# Patient Record
Sex: FEMALE | Race: BLACK OR AFRICAN AMERICAN | Employment: UNEMPLOYED | URBAN - METROPOLITAN AREA
[De-identification: names, ages, dates, MRNs, and addresses within clinical notes are randomized per-mention and may not be internally consistent; named-entity substitution may affect disease eponyms.]

---

## 2017-05-23 ENCOUNTER — OFFICE VISIT (OUTPATIENT)
Dept: OBGYN CLINIC | Age: 62
End: 2017-05-23

## 2017-05-23 VITALS
BODY MASS INDEX: 48.82 KG/M2 | SYSTOLIC BLOOD PRESSURE: 113 MMHG | RESPIRATION RATE: 22 BRPM | WEIGHT: 293 LBS | HEIGHT: 65 IN | TEMPERATURE: 98.2 F | DIASTOLIC BLOOD PRESSURE: 75 MMHG | HEART RATE: 109 BPM

## 2017-05-23 DIAGNOSIS — M79.652 PAIN IN BOTH THIGHS: ICD-10-CM

## 2017-05-23 DIAGNOSIS — N95.0 POST-MENOPAUSAL BLEEDING: Primary | ICD-10-CM

## 2017-05-23 DIAGNOSIS — M79.651 PAIN IN BOTH THIGHS: ICD-10-CM

## 2017-05-23 NOTE — MR AVS SNAPSHOT
Visit Information Date & Time Provider Department Dept. Phone Encounter #  
 5/23/2017  2:30 PM Mana Mcgee [phone] [id number] Follow-up Instructions Return in about 6 months (around 11/23/2017) for annual exam.  
  
Your Appointments 5/23/2017  2:30 PM  
6 MONTH with Yoselin Holt MD  
[facility] ([facility]) Appt Note: 6 month follow up for fibroids [place] [address] [id number]  
[phone]  
  
   
 [place] [address] [place] [address] Upcoming Health Maintenance Date Due Hepatitis C Screening 1955 BREAST CANCER SCRN MAMMOGRAM 3/2/2007 FOBT Q 1 YEAR AGE 50-75 3/2/2007 ZOSTER VACCINE AGE 60> 3/2/2017 INFLUENZA AGE 9 TO ADULT 8/1/2017 PAP AKA CERVICAL CYTOLOGY 11/11/2019 Allergies as of 5/23/2017  Review Complete On: 5/23/2017 By: Yoselin Holt MD  
 No Known Allergies Current Immunizations  Never Reviewed No immunizations on file. Not reviewed this visit You Were Diagnosed With   
  
 Codes Comments Post-menopausal bleeding    -  Primary ICD-10-CM: N95.0 ICD-9-CM: 627.1 Pain in both thighs     ICD-10-CM: M79.651, H17.937 ICD-9-CM: 729.5 Vitals BP Pulse Temp Resp Height(growth percentile) Weight(growth percentile) 113/75 (BP 1 Location: Right arm, BP Patient Position: Sitting) (!) 109 98.2 °F (36.8 °C) (Oral) 22 5' 5\" (1.651 m) 316 lb 9.6 oz (143.6 kg) LMP BMI OB Status Smoking Status 11/11/2006 52.68 kg/m2 Postmenopausal Never Smoker Vitals History BMI and BSA Data Body Mass Index Body Surface Area  
 52.68 kg/m 2 2.57 m 2 Your Updated Medication List  
  
   
This list is accurate as of: 5/23/17  2:09 PM.  Always use your most recent med list.  
  
  
  
  
 ALBUTEROL IN Take  by inhalation. allopurinol 300 mg tablet Commonly known as:  ZYLOPRIM  
daily. * AMLODIPINE PO Take  by mouth. * amLODIPine 10 mg tablet Commonly known as:  NORVASC  
daily. aspirin delayed-release 81 mg tablet Take  by mouth daily. colchicine 0.6 mg tablet Take 0.6 mg by mouth daily. * cyclobenzaprine 10 mg tablet Commonly known as:  FLEXERIL Take  by mouth three (3) times daily as needed for Muscle Spasm(s). * cyclobenzaprine 10 mg tablet Commonly known as:  FLEXERIL  
daily. * diclofenac EC 75 mg EC tablet Commonly known as:  VOLTAREN Take  by mouth. * diclofenac EC 75 mg EC tablet Commonly known as:  VOLTAREN  
75 mg two (2) times a day. DOCU PO Take  by mouth. * gabapentin 400 mg capsule Commonly known as:  NEURONTIN Take 400 mg by mouth three (3) times daily. Pt takes 400 mg BID  
  
 * gabapentin 400 mg capsule Commonly known as:  NEURONTIN  
two (2) times a day. GLUCOSAMINE CHONDROITIN MAXSTR PO Take  by mouth two (2) times a day. guaiFENesin -10 mg/5 mL solution Generic drug:  guaiFENesin-codeine  
  
 losartan 100 mg tablet Commonly known as:  COZAAR Take 100 mg by mouth daily. losartan-hydroCHLOROthiazide 100-25 mg per tablet Commonly known as:  HYZAAR MECLIZINE  
by Does Not Apply route. * metFORMIN 500 mg tablet Commonly known as:  GLUCOPHAGE Take 1,000 mg by mouth two (2) times a day. * metFORMIN 500 mg tablet Commonly known as:  GLUCOPHAGE Take 1 Tab by mouth two (2) times daily (with meals). OMEGA 3 FISH OIL PO Take  by mouth. ondansetron 8 mg disintegrating tablet Commonly known as:  ZOFRAN ODT Take 1 Tab by mouth every eight (8) hours as needed for Nausea. penicillin v potassium 250 mg tablet Commonly known as:  VEETID * pravastatin 20 mg tablet Commonly known as:  PRAVACHOL Take 20 mg by mouth nightly. * pravastatin 40 mg tablet Commonly known as:  PRAVACHOL PRENA-CAP PO Take  by mouth daily. PRENATAL PLUS (CALCIUM CARB) 27 mg iron- 1 mg Tab Generic drug:  prenatal vit-calcium-iron-fa SENNA LAX PO Take  by mouth two (2) times a day. STOOL SOFTENER PO Take  by mouth two (2) times a day. * traMADol 50 mg tablet Commonly known as:  ULTRAM  
Take 50 mg by mouth every six (6) hours as needed for Pain. * traMADol 50 mg tablet Commonly known as:  ULTRAM  
  
 triamcinolone acetonide 0.1 % topical cream  
Commonly known as:  KENALOG  
  
 * albuterol 4 mg tablet Commonly known as:  PROVENTIL Take  by mouth two (2) times a day. * VENTOLIN HFA 90 mcg/actuation inhaler Generic drug:  albuterol VITAMIN D3 1,000 unit Cap Generic drug:  cholecalciferol Take  by mouth daily. * Notice: This list has 16 medication(s) that are the same as other medications prescribed for you. Read the directions carefully, and ask your doctor or other care provider to review them with you. Follow-up Instructions Return in about 6 months (around 11/23/2017) for annual exam.  
  
  
Patient Instructions Hot Flashes During Menopause: Care Instructions Your Care Instructions A hot flash is a sudden feeling of intense body heat. Your head, neck, and chest may get red. Your heartbeat may speed up, and you may feel anxious or irritable. You may find that hot flashes occur more often in warm rooms or during stressful times. Hot flashes and other symptoms are a normal response to the hormone changes that occur before your menstrual cycle goes away completely (menopause). Hot flashes often get better and go away with time. Making a few changes, such as exercising more, practicing meditation, quitting smoking, and drinking less alcohol, can help. Some women take hormone pills or other medicine to treat bothersome symptoms. Follow-up care is a key part of your treatment and safety. Be sure to make and go to all appointments, and call your doctor if you are having problems. Its also a good idea to know your test results and keep a list of the medicines you take. How can you care for yourself at home? · If you decide to take medicine to treat hot flashes, take it exactly as prescribed. Call your doctor if you think you are having a problem with your medicine. You will get more details on the specific medicine your doctor prescribes. · Learn to meditate. Sit quietly and focus on your breathing. Try to practice each day. Books, classes, and tapes can help you start a program. 
· Wear natural fabrics, such as cotton and silk. Dress in layers so you can take off clothes as needed. · Keep the room temperature cool, or use a fan. You are more likely to have a hot flash when you are too warm than when you are cool. · Use fewer blankets when you sleep at night. · Drink cold fluids rather than hot ones. Limit your intake of caffeine and alcohol. · Eat smaller meals more often during the day so your body makes less heat than when digesting large amounts of food. Eat low-fat and high-fiber foods. · Do not smoke. Smoking can make hot flashes worse. If you need help quitting, talk to your doctor about stop-smoking programs and medicines. These can increase your chances of quitting for good. · Get at least 30 minutes of exercise on most days of the week. Walking is a good choice. You also may want to do other activities, such as running, swimming, cycling, or playing tennis or team sports. Where can you learn more? Go to http://hayde-chelsea.info/. Enter F700 in the search box to learn more about \"Hot Flashes During Menopause: Care Instructions. \" Current as of: October 13, 2016 Content Version: 11.2 © 8992-1775 [employer].  Care instructions adapted under license by [address] (which disclaims liability or warranty for this information). If you have questions about a medical condition or this instruction, always ask your healthcare professional. [address] any warranty or liability for your use of this information. Please provide this summary of care documentation to your next provider. Your primary care clinician is listed as Yanira Disla. If you have any questions after today's visit, please call [phone].

## 2017-05-23 NOTE — PROGRESS NOTES
Chief Complaint   Patient presents with    Fibroids     6 month follow up     Pt complains of pains \"shooting through my thighs. \" Pt will discuss with her PCP.

## 2017-05-23 NOTE — PATIENT INSTRUCTIONS
Hot Flashes During Menopause: Care Instructions  Your Care Instructions  A hot flash is a sudden feeling of intense body heat. Your head, neck, and chest may get red. Your heartbeat may speed up, and you may feel anxious or irritable. You may find that hot flashes occur more often in warm rooms or during stressful times. Hot flashes and other symptoms are a normal response to the hormone changes that occur before your menstrual cycle goes away completely (menopause). Hot flashes often get better and go away with time. Making a few changes, such as exercising more, practicing meditation, quitting smoking, and drinking less alcohol, can help. Some women take hormone pills or other medicine to treat bothersome symptoms. Follow-up care is a key part of your treatment and safety. Be sure to make and go to all appointments, and call your doctor if you are having problems. Its also a good idea to know your test results and keep a list of the medicines you take. How can you care for yourself at home? · If you decide to take medicine to treat hot flashes, take it exactly as prescribed. Call your doctor if you think you are having a problem with your medicine. You will get more details on the specific medicine your doctor prescribes. · Learn to meditate. Sit quietly and focus on your breathing. Try to practice each day. Books, classes, and tapes can help you start a program.  · Wear natural fabrics, such as cotton and silk. Dress in layers so you can take off clothes as needed. · Keep the room temperature cool, or use a fan. You are more likely to have a hot flash when you are too warm than when you are cool. · Use fewer blankets when you sleep at night. · Drink cold fluids rather than hot ones. Limit your intake of caffeine and alcohol. · Eat smaller meals more often during the day so your body makes less heat than when digesting large amounts of food. Eat low-fat and high-fiber foods. · Do not smoke.  Smoking can make hot flashes worse. If you need help quitting, talk to your doctor about stop-smoking programs and medicines. These can increase your chances of quitting for good. · Get at least 30 minutes of exercise on most days of the week. Walking is a good choice. You also may want to do other activities, such as running, swimming, cycling, or playing tennis or team sports. Where can you learn more? Go to http://hayde-chelsea.info/. Enter F700 in the search box to learn more about \"Hot Flashes During Menopause: Care Instructions. \"  Current as of: October 13, 2016  Content Version: 11.2  © 8602-4337 [employer], [employer]. Care instructions adapted under license by [facility] (which disclaims liability or warranty for this information). If you have questions about a medical condition or this instruction, always ask your healthcare professional. [address] any warranty or liability for your use of this information.

## 2017-05-25 NOTE — PROGRESS NOTES
FOLLOW UP VISIT    SUBJECTIVE: Nahun Urena is a 63 y.o. female who presents to follow up from her last visit. Polyp removed in office. Had  bleeding. No bleeding since that time . Patient's last menstrual period was 11/11/2006. .    ROS: Pertinent items are noted in HPI. OBJECTIVE:     Visit Vitals    /75 (BP 1 Location: Right arm, BP Patient Position: Sitting)    Pulse (!) 109    Temp 98.2 °F (36.8 °C) (Oral)    Resp 22    Ht 5' 5\" (1.651 m)    Wt 316 lb 9.6 oz (143.6 kg)    LMP 11/11/2006    BMI 52.68 kg/m2       General:  alert, cooperative, no distress, appears stated age   Skin:  Normal.   Extremities:  extremities normal, atraumatic, no cyanosis or edema   Neurologic:  negative   Psychiatric:  non focal       ASSESSMENT:      ICD-10-CM ICD-9-CM    1. Post-menopausal bleeding N95.0 627.1    2. Pain in both thighs M79.651 729.5     M79.652       Bleeding resolved   plan for annual exam     Follow-up Disposition:  Return in about 6 months (around 11/23/2017) for annual exam.    Today's care was reviewed and discussed with the patient. She expresses understanding and approval of today's plan.     Gabbi Santacruz MD

## 2018-11-05 ENCOUNTER — HOSPITAL ENCOUNTER (INPATIENT)
Age: 63
LOS: 1 days | Discharge: HOME | DRG: 460 | End: 2018-11-08
Attending: EMERGENCY MEDICINE | Admitting: HOSPITALIST
Payer: COMMERCIAL

## 2018-11-05 ENCOUNTER — APPOINTMENT (OUTPATIENT)
Dept: ULTRASOUND IMAGING | Age: 63
DRG: 460 | End: 2018-11-05
Attending: EMERGENCY MEDICINE
Payer: COMMERCIAL

## 2018-11-05 DIAGNOSIS — N17.9 AKI (ACUTE KIDNEY INJURY) (HCC): Primary | ICD-10-CM

## 2018-11-05 LAB
ALBUMIN SERPL-MCNC: 3.4 G/DL (ref 3.5–5)
ALBUMIN/GLOB SERPL: 0.7 {RATIO} (ref 1.1–2.2)
ALP SERPL-CCNC: 78 U/L (ref 45–117)
ALT SERPL-CCNC: 20 U/L (ref 12–78)
ANION GAP BLD CALC-SCNC: 17 MMOL/L (ref 10–20)
ANION GAP SERPL CALC-SCNC: 13 MMOL/L (ref 5–15)
APPEARANCE UR: ABNORMAL
AST SERPL-CCNC: 15 U/L (ref 15–37)
ATRIAL RATE: 94 BPM
BACTERIA URNS QL MICRO: ABNORMAL /HPF
BASOPHILS # BLD: 0 K/UL (ref 0–0.1)
BASOPHILS NFR BLD: 0 % (ref 0–1)
BILIRUB SERPL-MCNC: 0.5 MG/DL (ref 0.2–1)
BILIRUB UR QL CFM: NEGATIVE
BUN BLD-MCNC: 89 MG/DL (ref 9–20)
BUN SERPL-MCNC: 100 MG/DL (ref 6–20)
BUN/CREAT SERPL: 13 (ref 12–20)
CA-I BLD-MCNC: 1.03 MMOL/L (ref 1.12–1.32)
CALCIUM SERPL-MCNC: 9.1 MG/DL (ref 8.5–10.1)
CALCULATED P AXIS, ECG09: 44 DEGREES
CALCULATED R AXIS, ECG10: 1 DEGREES
CALCULATED T AXIS, ECG11: 48 DEGREES
CHLORIDE BLD-SCNC: 104 MMOL/L (ref 98–107)
CHLORIDE SERPL-SCNC: 102 MMOL/L (ref 97–108)
CK SERPL-CCNC: 182 U/L (ref 26–192)
CO2 BLD-SCNC: 22 MMOL/L (ref 21–32)
CO2 SERPL-SCNC: 21 MMOL/L (ref 21–32)
COLOR UR: ABNORMAL
CREAT BLD-MCNC: 7.7 MG/DL (ref 0.6–1.3)
CREAT SERPL-MCNC: 7.52 MG/DL (ref 0.55–1.02)
CREAT UR-MCNC: 280 MG/DL
DIAGNOSIS, 93000: NORMAL
DIFFERENTIAL METHOD BLD: ABNORMAL
EOSINOPHIL # BLD: 0.3 K/UL (ref 0–0.4)
EOSINOPHIL NFR BLD: 3 % (ref 0–7)
EPITH CASTS URNS QL MICRO: ABNORMAL /LPF
ERYTHROCYTE [DISTWIDTH] IN BLOOD BY AUTOMATED COUNT: 14.3 % (ref 11.5–14.5)
GLOBULIN SER CALC-MCNC: 4.6 G/DL (ref 2–4)
GLUCOSE BLD STRIP.AUTO-MCNC: 100 MG/DL (ref 65–100)
GLUCOSE BLD STRIP.AUTO-MCNC: 90 MG/DL (ref 65–100)
GLUCOSE BLD-MCNC: 84 MG/DL (ref 65–100)
GLUCOSE SERPL-MCNC: 76 MG/DL (ref 65–100)
GLUCOSE UR STRIP.AUTO-MCNC: NEGATIVE MG/DL
HCT VFR BLD AUTO: 32 % (ref 35–47)
HCT VFR BLD CALC: 30 % (ref 35–47)
HGB BLD-MCNC: 9.7 G/DL (ref 11.5–16)
HGB UR QL STRIP: NEGATIVE
IMM GRANULOCYTES # BLD: 0 K/UL (ref 0–0.04)
IMM GRANULOCYTES NFR BLD AUTO: 0 % (ref 0–0.5)
KETONES UR QL STRIP.AUTO: NEGATIVE MG/DL
LEUKOCYTE ESTERASE UR QL STRIP.AUTO: ABNORMAL
LYMPHOCYTES # BLD: 1.9 K/UL (ref 0.8–3.5)
LYMPHOCYTES NFR BLD: 20 % (ref 12–49)
MCH RBC QN AUTO: 29.1 PG (ref 26–34)
MCHC RBC AUTO-ENTMCNC: 30.3 G/DL (ref 30–36.5)
MCV RBC AUTO: 96.1 FL (ref 80–99)
MONOCYTES # BLD: 0.6 K/UL (ref 0–1)
MONOCYTES NFR BLD: 7 % (ref 5–13)
NEUTS SEG # BLD: 6.7 K/UL (ref 1.8–8)
NEUTS SEG NFR BLD: 71 % (ref 32–75)
NITRITE UR QL STRIP.AUTO: NEGATIVE
NRBC # BLD: 0 K/UL (ref 0–0.01)
NRBC BLD-RTO: 0 PER 100 WBC
OTHER,OTHU: ABNORMAL
P-R INTERVAL, ECG05: 148 MS
PH UR STRIP: 5 [PH] (ref 5–8)
PLATELET # BLD AUTO: 297 K/UL (ref 150–400)
PMV BLD AUTO: 11.1 FL (ref 8.9–12.9)
POTASSIUM BLD-SCNC: 4.7 MMOL/L (ref 3.5–5.1)
POTASSIUM SERPL-SCNC: 4.4 MMOL/L (ref 3.5–5.1)
PROT SERPL-MCNC: 8 G/DL (ref 6.4–8.2)
PROT UR STRIP-MCNC: NEGATIVE MG/DL
PROT UR-MCNC: 28 MG/DL (ref 0–11.9)
PROT/CREAT UR-RTO: 0.1
Q-T INTERVAL, ECG07: 370 MS
QRS DURATION, ECG06: 78 MS
QTC CALCULATION (BEZET), ECG08: 462 MS
RBC # BLD AUTO: 3.33 M/UL (ref 3.8–5.2)
RBC #/AREA URNS HPF: ABNORMAL /HPF (ref 0–5)
SERVICE CMNT-IMP: ABNORMAL
SERVICE CMNT-IMP: NORMAL
SERVICE CMNT-IMP: NORMAL
SODIUM BLD-SCNC: 138 MMOL/L (ref 136–145)
SODIUM SERPL-SCNC: 136 MMOL/L (ref 136–145)
SP GR UR REFRACTOMETRY: 1.02 (ref 1–1.03)
UROBILINOGEN UR QL STRIP.AUTO: 1 EU/DL (ref 0.2–1)
VENTRICULAR RATE, ECG03: 94 BPM
WBC # BLD AUTO: 9.5 K/UL (ref 3.6–11)
WBC URNS QL MICRO: ABNORMAL /HPF (ref 0–4)

## 2018-11-05 PROCEDURE — 80053 COMPREHEN METABOLIC PANEL: CPT | Performed by: EMERGENCY MEDICINE

## 2018-11-05 PROCEDURE — 93005 ELECTROCARDIOGRAM TRACING: CPT

## 2018-11-05 PROCEDURE — 99218 HC RM OBSERVATION: CPT

## 2018-11-05 PROCEDURE — 36415 COLL VENOUS BLD VENIPUNCTURE: CPT | Performed by: EMERGENCY MEDICINE

## 2018-11-05 PROCEDURE — 82962 GLUCOSE BLOOD TEST: CPT

## 2018-11-05 PROCEDURE — 74011250636 HC RX REV CODE- 250/636: Performed by: INTERNAL MEDICINE

## 2018-11-05 PROCEDURE — 76770 US EXAM ABDO BACK WALL COMP: CPT

## 2018-11-05 PROCEDURE — 82550 ASSAY OF CK (CPK): CPT | Performed by: INTERNAL MEDICINE

## 2018-11-05 PROCEDURE — 77030029684 HC NEB SM VOL KT MONA -A

## 2018-11-05 PROCEDURE — 99285 EMERGENCY DEPT VISIT HI MDM: CPT

## 2018-11-05 PROCEDURE — 84156 ASSAY OF PROTEIN URINE: CPT | Performed by: INTERNAL MEDICINE

## 2018-11-05 PROCEDURE — 85025 COMPLETE CBC W/AUTO DIFF WBC: CPT | Performed by: EMERGENCY MEDICINE

## 2018-11-05 PROCEDURE — 94640 AIRWAY INHALATION TREATMENT: CPT

## 2018-11-05 PROCEDURE — 81001 URINALYSIS AUTO W/SCOPE: CPT | Performed by: INTERNAL MEDICINE

## 2018-11-05 PROCEDURE — 74011000250 HC RX REV CODE- 250: Performed by: INTERNAL MEDICINE

## 2018-11-05 PROCEDURE — 74011250637 HC RX REV CODE- 250/637: Performed by: INTERNAL MEDICINE

## 2018-11-05 PROCEDURE — 80047 BASIC METABLC PNL IONIZED CA: CPT

## 2018-11-05 PROCEDURE — 74011636637 HC RX REV CODE- 636/637: Performed by: INTERNAL MEDICINE

## 2018-11-05 RX ORDER — DEXTROSE 50 % IN WATER (D50W) INTRAVENOUS SYRINGE
25-50 AS NEEDED
Status: DISCONTINUED | OUTPATIENT
Start: 2018-11-05 | End: 2018-11-08 | Stop reason: HOSPADM

## 2018-11-05 RX ORDER — ALBUTEROL SULFATE 0.83 MG/ML
2.5 SOLUTION RESPIRATORY (INHALATION)
Status: DISCONTINUED | OUTPATIENT
Start: 2018-11-05 | End: 2018-11-05 | Stop reason: SDUPTHER

## 2018-11-05 RX ORDER — AMLODIPINE BESYLATE 5 MG/1
5 TABLET ORAL DAILY
COMMUNITY
End: 2018-11-08

## 2018-11-05 RX ORDER — HEPARIN SODIUM 5000 [USP'U]/ML
5000 INJECTION, SOLUTION INTRAVENOUS; SUBCUTANEOUS EVERY 8 HOURS
Status: DISCONTINUED | OUTPATIENT
Start: 2018-11-05 | End: 2018-11-08 | Stop reason: HOSPADM

## 2018-11-05 RX ORDER — HYDROCODONE BITARTRATE AND ACETAMINOPHEN 5; 325 MG/1; MG/1
1 TABLET ORAL
Status: DISCONTINUED | OUTPATIENT
Start: 2018-11-05 | End: 2018-11-08 | Stop reason: HOSPADM

## 2018-11-05 RX ORDER — INSULIN LISPRO 100 [IU]/ML
0.05 INJECTION, SOLUTION INTRAVENOUS; SUBCUTANEOUS
Status: DISCONTINUED | OUTPATIENT
Start: 2018-11-05 | End: 2018-11-06

## 2018-11-05 RX ORDER — LABETALOL 100 MG/1
100 TABLET, FILM COATED ORAL 2 TIMES DAILY
Status: DISCONTINUED | OUTPATIENT
Start: 2018-11-05 | End: 2018-11-07

## 2018-11-05 RX ORDER — SODIUM CHLORIDE 0.9 % (FLUSH) 0.9 %
5-10 SYRINGE (ML) INJECTION AS NEEDED
Status: DISCONTINUED | OUTPATIENT
Start: 2018-11-05 | End: 2018-11-08 | Stop reason: HOSPADM

## 2018-11-05 RX ORDER — ALLOPURINOL 300 MG/1
300 TABLET ORAL DAILY
Status: DISCONTINUED | OUTPATIENT
Start: 2018-11-06 | End: 2018-11-08 | Stop reason: HOSPADM

## 2018-11-05 RX ORDER — DICLOFENAC SODIUM 10 MG/G
GEL TOPICAL
COMMUNITY
End: 2018-11-08

## 2018-11-05 RX ORDER — VALSARTAN AND HYDROCHLOROTHIAZIDE 320; 25 MG/1; MG/1
1 TABLET, FILM COATED ORAL DAILY
COMMUNITY
End: 2018-11-08

## 2018-11-05 RX ORDER — CYCLOBENZAPRINE HCL 10 MG
10 TABLET ORAL
Status: DISCONTINUED | OUTPATIENT
Start: 2018-11-05 | End: 2018-11-08 | Stop reason: HOSPADM

## 2018-11-05 RX ORDER — ONDANSETRON 2 MG/ML
4 INJECTION INTRAMUSCULAR; INTRAVENOUS
Status: DISCONTINUED | OUTPATIENT
Start: 2018-11-05 | End: 2018-11-08 | Stop reason: HOSPADM

## 2018-11-05 RX ORDER — TRAMADOL HYDROCHLORIDE 50 MG/1
100 TABLET ORAL 3 TIMES DAILY
Status: DISCONTINUED | OUTPATIENT
Start: 2018-11-05 | End: 2018-11-08 | Stop reason: HOSPADM

## 2018-11-05 RX ORDER — SODIUM CHLORIDE 0.9 % (FLUSH) 0.9 %
5-10 SYRINGE (ML) INJECTION EVERY 8 HOURS
Status: DISCONTINUED | OUTPATIENT
Start: 2018-11-05 | End: 2018-11-08 | Stop reason: HOSPADM

## 2018-11-05 RX ORDER — SODIUM CHLORIDE 9 MG/ML
75 INJECTION, SOLUTION INTRAVENOUS CONTINUOUS
Status: DISCONTINUED | OUTPATIENT
Start: 2018-11-05 | End: 2018-11-08 | Stop reason: HOSPADM

## 2018-11-05 RX ORDER — MAGNESIUM SULFATE 100 %
4 CRYSTALS MISCELLANEOUS AS NEEDED
Status: DISCONTINUED | OUTPATIENT
Start: 2018-11-05 | End: 2018-11-08 | Stop reason: HOSPADM

## 2018-11-05 RX ORDER — DICLOFENAC SODIUM 75 MG/1
75 TABLET, DELAYED RELEASE ORAL 2 TIMES DAILY
Status: DISCONTINUED | OUTPATIENT
Start: 2018-11-05 | End: 2018-11-06

## 2018-11-05 RX ORDER — SODIUM CHLORIDE 9 MG/ML
500 INJECTION, SOLUTION INTRAVENOUS CONTINUOUS
Status: DISPENSED | OUTPATIENT
Start: 2018-11-05 | End: 2018-11-05

## 2018-11-05 RX ORDER — TRAMADOL HYDROCHLORIDE 50 MG/1
100 TABLET ORAL 3 TIMES DAILY
COMMUNITY
End: 2020-08-31

## 2018-11-05 RX ORDER — INSULIN GLARGINE 100 [IU]/ML
0.3 INJECTION, SOLUTION SUBCUTANEOUS
Status: DISCONTINUED | OUTPATIENT
Start: 2018-11-05 | End: 2018-11-08 | Stop reason: HOSPADM

## 2018-11-05 RX ORDER — PRAVASTATIN SODIUM 40 MG/1
40 TABLET ORAL DAILY
Status: DISCONTINUED | OUTPATIENT
Start: 2018-11-06 | End: 2018-11-08 | Stop reason: HOSPADM

## 2018-11-05 RX ORDER — ALBUTEROL SULFATE 0.83 MG/ML
2.5 SOLUTION RESPIRATORY (INHALATION)
Status: DISCONTINUED | OUTPATIENT
Start: 2018-11-05 | End: 2018-11-08 | Stop reason: HOSPADM

## 2018-11-05 RX ORDER — LABETALOL 100 MG/1
100 TABLET, FILM COATED ORAL 2 TIMES DAILY
COMMUNITY
End: 2018-11-08

## 2018-11-05 RX ORDER — ASPIRIN 81 MG/1
81 TABLET ORAL DAILY
Status: DISCONTINUED | OUTPATIENT
Start: 2018-11-06 | End: 2018-11-08 | Stop reason: HOSPADM

## 2018-11-05 RX ORDER — ACETAMINOPHEN 500 MG/1
1000 CAPSULE, LIQUID FILLED ORAL 2 TIMES DAILY
COMMUNITY
End: 2021-05-17

## 2018-11-05 RX ORDER — METFORMIN HYDROCHLORIDE 500 MG/1
1000 TABLET, FILM COATED, EXTENDED RELEASE ORAL 2 TIMES DAILY
COMMUNITY
End: 2018-11-08

## 2018-11-05 RX ORDER — AMLODIPINE BESYLATE 5 MG/1
5 TABLET ORAL DAILY
Status: DISCONTINUED | OUTPATIENT
Start: 2018-11-06 | End: 2018-11-07

## 2018-11-05 RX ORDER — INSULIN LISPRO 100 [IU]/ML
INJECTION, SOLUTION INTRAVENOUS; SUBCUTANEOUS
Status: DISCONTINUED | OUTPATIENT
Start: 2018-11-05 | End: 2018-11-08 | Stop reason: HOSPADM

## 2018-11-05 RX ADMIN — ALBUTEROL SULFATE 2.5 MG: 2.5 SOLUTION RESPIRATORY (INHALATION) at 16:56

## 2018-11-05 RX ADMIN — TRAMADOL HYDROCHLORIDE 100 MG: 50 TABLET, FILM COATED ORAL at 17:36

## 2018-11-05 RX ADMIN — SODIUM CHLORIDE 500 ML: 900 INJECTION, SOLUTION INTRAVENOUS at 21:29

## 2018-11-05 RX ADMIN — Medication 10 ML: at 15:22

## 2018-11-05 RX ADMIN — TRAMADOL HYDROCHLORIDE 100 MG: 50 TABLET, FILM COATED ORAL at 22:32

## 2018-11-05 RX ADMIN — DICLOFENAC SODIUM 75 MG: 75 TABLET, DELAYED RELEASE ORAL at 17:36

## 2018-11-05 RX ADMIN — HYDROCODONE BITARTRATE AND ACETAMINOPHEN 1 TABLET: 5; 325 TABLET ORAL at 15:18

## 2018-11-05 RX ADMIN — INSULIN LISPRO 7 UNITS: 100 INJECTION, SOLUTION INTRAVENOUS; SUBCUTANEOUS at 17:36

## 2018-11-05 RX ADMIN — LABETALOL HYDROCHLORIDE 100 MG: 100 TABLET, FILM COATED ORAL at 17:36

## 2018-11-05 RX ADMIN — GABAPENTIN 400 MG: 100 CAPSULE ORAL at 22:32

## 2018-11-05 RX ADMIN — SODIUM CHLORIDE 100 ML/HR: 900 INJECTION, SOLUTION INTRAVENOUS at 15:22

## 2018-11-05 RX ADMIN — HEPARIN SODIUM 5000 UNITS: 5000 INJECTION INTRAVENOUS; SUBCUTANEOUS at 15:28

## 2018-11-05 RX ADMIN — HYDROCODONE BITARTRATE AND ACETAMINOPHEN 1 TABLET: 5; 325 TABLET ORAL at 20:44

## 2018-11-05 NOTE — ED PROVIDER NOTES
66 y.o. female with past medical history significant for Cardiac murmur, Neuropathy, Gout, Arthritis, Diabetes, Hypertension, and Asthma who presents via EMS with chief complaint of abnormal lab results. Patient was seen by her PCP on 11/02/2018 for routine blood work, was called today for abnormal blood work showing decreased kidney function, and referred to [facility] ED for further evaluation. Per EMS, en route to [facility] ED patient's blood pressure was 114/74 and blood sugar 114. Patient presents to [facility] ED with no new pain or discomfort, but notes feeling \"jittery\" since the call from her PCP. Patient reports accompanying bilateral knee pain which she attributes to her baseline arthritis, as well as recently onset blood in stool described as \"bright red\" which she attributes to \"strain\" due to infrequent bowel movements. Patient notes she has seen her PCP for blood in stool and has an upcoming colonoscopy scheduled. Patient reports taking Voltaren, Hydrochlorothiazide, and NSAID daily, but states she did not take the medications this morning. Patient notes having an upcoming gastric bypass surgery that has not yet been scheduled but that she has been preparing for. Patient states she lives at a private residence but has a care giver that lives with her. Patient denies the use of tobacco, alcohol, or elicit drugs. Pt denies fever, chills, cough, congestion, shortness of breath, chest pain, abdominal pain, nausea, vomiting, diarrhea, urinary frequency, hematuria, difficulty with urination or dysuria. There are no other acute medical concerns at this time. PCP: Ro Kearney MD 
 
Note written by Will Curran, as dictated by Raul Castillo MD 9:47 AM 
 
 
 
The history is provided by the patient and the EMS personnel. Past Medical History:  
Diagnosis Date  Arthritis  Asthma  Cardiac murmur  Diabetes ([address]  Fibroid, uterine  Hypertension  Neurological disorder neuropathy  Other ill-defined conditions(799.89)   
 gout  Sleep disorder Past Surgical History:  
Procedure Laterality Date  HX ORTHOPAEDIC    
 left shoulder - rotater cuff  HX ORTHOPAEDIC    
 HX TONSILLECTOMY [address]  LAP,TUBAL CAUTERY Family History:  
Problem Relation Age of Onset  Cancer Maternal Grandfather  Alcohol abuse Mother  Asthma Maternal Grandmother  Diabetes Paternal Grandmother Social History Socioeconomic History  Marital status: SINGLE Spouse name: Not on file  Number of children: Not on file  Years of education: Not on file  Highest education level: Not on file Social Needs  Financial resource strain: Not on file  Food insecurity - worry: Not on file  Food insecurity - inability: Not on file  Transportation needs - medical: Not on file  Transportation needs - non-medical: Not on file Occupational History  Not on file Tobacco Use  Smoking status: Never Smoker  Smokeless tobacco: Never Used Substance and Sexual Activity  Alcohol use: No  
  Comment: occassionaly  Drug use: No  
 Sexual activity: No  
Other Topics Concern  Not on file Social History Narrative ** Merged History Encounter ** ALLERGIES: Patient has no known allergies. Review of Systems Constitutional: Negative for chills and fever. HENT: Negative for congestion. Respiratory: Negative for cough and shortness of breath. Gastrointestinal: Positive for blood in stool. Negative for abdominal pain, diarrhea, nausea and vomiting. Genitourinary: Negative for difficulty urinating, dysuria, frequency and hematuria. All other systems reviewed and are negative. Vitals:  
 11/05/18 9979 BP: 118/56 Pulse: 82 Resp: 16 Temp: 98.3 °F (36.8 °C) SpO2: 95% Weight: 132.6 kg (292 lb 5.3 oz) Height: 5' 5\" (1.651 m) Physical Exam  
 
 Nursing note and vitals reviewed. Constitutional: appears well-developed and well-nourished. No distress. HENT:  
Head: Normocephalic and atraumatic. Sclera anicteric Nose: No rhinorrhea Mouth/Throat: Oropharynx is clear and moist. Pharynx normal 
Eyes: Conjunctivae are normal. Pupils are equal, round, and reactive to light. Right eye exhibits no discharge. Left eye exhibits no discharge. No scleral icterus. Neck: Painless normal range of motion. Supple Cardiovascular: Normal rate, regular rhythm, normal heart sounds and intact distal pulses. Exam reveals no gallop and no friction rub. No murmur heard. Pulmonary/Chest: Effort normal and breath sounds normal. No respiratory distress. no wheezes. no rales. Abdominal: Soft. Bowel sounds are normal. Exhibits no distension and no mass. No tenderness. No guarding. OBESE. Musculoskeletal: Normal range of motion. no tenderness. No edema Lymphadenopathy:   No cervical adenopathy. Neurological:  Alert and oriented to person, place, and time. Coordination normal. CN 2-12 intact. Moving all extremities. Skin: Skin is warm and dry. No rash noted. No pallor. MDM 
 67 yo female referred for abnormal kidney labs. No other symptoms. Reports good uop. Will d/w pcp to obtain results. Check basic labs cbc and cmp and ua. Procedures CONSULT NOTE: 
Mike Dukes MD spoke with Vignesh Mercado MD Discussed patient's presentation, history, physical assessment, and available diagnostic results. Dr. Martínez Mcneil states patient's BUN 69/4.4, K+ 4.7, and GFR 13 on 11/02/2018 compared to her blood work August 2018 which showed BUN 18/1.0 and GFR 7.0. Dr. Martínez Mcneil also states the patient does not have any new changes in medications. CONSULT NOTE: 
10:05 AM Mike Dukes MD spoke with Dr. Felton Albarran, Consult for Nephrology. Discussed available diagnostic tests and clinical findings. Dr. Gale King will evaluate patient.  
 
PROGRESS NOTE: 
 10:15 AM Patient's creatinine 7.7 and K+ 4.7. PROGRESS NOTE: 
10:38 AM Scanned in lab work from PCP, UA showed moderate protein. CONSULT NOTE: 
10:44 AM Félix Galeana MD communicated with Sharyn Ramírez NP Consult for Hospitalist via [facility] Text. Discussed available diagnostic tests and clinical findings. Dr. Jarod Franco will evaluate patient for possible admission. Dr. Jarod Franco will admit patient. ED EKG interpretation: 
Rhythm: normal sinus rhythm; and regular . Rate (approx.): 94; Axis: normal; P wave: normal; QRS interval: normal ; ST/T wave: normal; . This EKG was interpreted by Micaela Longoria MD,ED Provider. Recent Results (from the past 24 hour(s)) CBC WITH AUTOMATED DIFF Collection Time: 11/05/18  9:49 AM  
Result Value Ref Range WBC 9.5 3.6 - 11.0 K/uL  
 RBC 3.33 (L) 3.80 - 5.20 M/uL HGB 9.7 (L) 11.5 - 16.0 g/dL HCT 32.0 (L) 35.0 - 47.0 % MCV 96.1 80.0 - 99.0 FL  
 MCH 29.1 26.0 - 34.0 PG  
 MCHC 30.3 30.0 - 36.5 g/dL  
 RDW 14.3 11.5 - 14.5 % PLATELET 515 393 - 539 K/uL MPV 11.1 8.9 - 12.9 FL  
 NRBC 0.0 0  WBC ABSOLUTE NRBC 0.00 0.00 - 0.01 K/uL NEUTROPHILS 71 32 - 75 % LYMPHOCYTES 20 12 - 49 % MONOCYTES 7 5 - 13 % EOSINOPHILS 3 0 - 7 % BASOPHILS 0 0 - 1 % IMMATURE GRANULOCYTES 0 0.0 - 0.5 % ABS. NEUTROPHILS 6.7 1.8 - 8.0 K/UL  
 ABS. LYMPHOCYTES 1.9 0.8 - 3.5 K/UL  
 ABS. MONOCYTES 0.6 0.0 - 1.0 K/UL  
 ABS. EOSINOPHILS 0.3 0.0 - 0.4 K/UL  
 ABS. BASOPHILS 0.0 0.0 - 0.1 K/UL  
 ABS. IMM. GRANS. 0.0 0.00 - 0.04 K/UL  
 DF AUTOMATED METABOLIC PANEL, COMPREHENSIVE Collection Time: 11/05/18  9:49 AM  
Result Value Ref Range Sodium 136 136 - 145 mmol/L Potassium 4.4 3.5 - 5.1 mmol/L Chloride 102 97 - 108 mmol/L  
 CO2 21 21 - 32 mmol/L Anion gap 13 5 - 15 mmol/L Glucose 76 65 - 100 mg/dL  (H) 6 - 20 MG/DL  Creatinine 7.52 (H) 0.55 - 1.02 MG/DL  
 BUN/Creatinine ratio 13 12 - 20    
 GFR est AA 7 (L) >60 ml/min/1.73m2 GFR est non-AA 5 (L) >60 ml/min/1.73m2 Calcium 9.1 8.5 - 10.1 MG/DL Bilirubin, total 0.5 0.2 - 1.0 MG/DL  
 ALT (SGPT) 20 12 - 78 U/L  
 AST (SGOT) 15 15 - 37 U/L Alk. phosphatase 78 45 - 117 U/L Protein, total 8.0 6.4 - 8.2 g/dL Albumin 3.4 (L) 3.5 - 5.0 g/dL Globulin 4.6 (H) 2.0 - 4.0 g/dL A-G Ratio 0.7 (L) 1.1 - 2.2 POC CHEM8 Collection Time: 11/05/18 10:14 AM  
Result Value Ref Range Calcium, ionized (POC) 1.03 (L) 1.12 - 1.32 mmol/L Sodium (POC) 138 136 - 145 mmol/L Potassium (POC) 4.7 3.5 - 5.1 mmol/L Chloride (POC) 104 98 - 107 mmol/L  
 CO2 (POC) 22 21 - 32 mmol/L Anion gap (POC) 17 10 - 20 mmol/L Glucose (POC) 84 65 - 100 mg/dL BUN (POC) 89 (H) 9 - 20 mg/dL Creatinine (POC) 7.7 (H) 0.6 - 1.3 mg/dL GFRAA, POC 6 (L) >60 ml/min/1.73m2 GFRNA, POC 5 (L) >60 ml/min/1.73m2 Hematocrit (POC) 30 (L) 35.0 - 47.0 % Comment Comment Not Indicated. EKG, 12 LEAD, INITIAL Collection Time: 11/05/18 11:24 AM  
Result Value Ref Range Ventricular Rate 94 BPM  
 Atrial Rate 94 BPM  
 P-R Interval 148 ms QRS Duration 78 ms Q-T Interval 370 ms QTC Calculation (Bezet) 462 ms Calculated P Axis 44 degrees Calculated R Axis 1 degrees Calculated T Axis 48 degrees Diagnosis Normal sinus rhythm No previous ECGs available No results found.

## 2018-11-05 NOTE — ROUTINE PROCESS
TRANSFER - OUT REPORT: 
 
Verbal report given to Maribel Basurto RN(name) on Cierra Ruff  being transferred to 6E(unit) for routine progression of care Report consisted of patients Situation, Background, Assessment and  
Recommendations(SBAR). Information from the following report(s) SBAR, ED Summary, [facility] and Recent Results was reviewed with the receiving nurse. Lines:  
Peripheral IV 11/05/18 Right Antecubital (Active) Site Assessment Clean, dry, & intact 11/5/2018 11:41 AM  
Phlebitis Assessment 0 11/5/2018 11:41 AM  
Infiltration Assessment 0 11/5/2018 11:41 AM  
Dressing Status Clean, dry, & intact 11/5/2018 11:41 AM  
Dressing Type Transparent 11/5/2018 11:41 AM  
Hub Color/Line Status Blue 11/5/2018 11:41 AM  
Action Taken Blood drawn 11/5/2018 11:41 AM  
   
Peripheral IV 11/05/18 Right;Upper Other(comment) (Active) Site Assessment Clean, dry, & intact 11/5/2018 11:42 AM  
Phlebitis Assessment 0 11/5/2018 11:42 AM  
Infiltration Assessment 0 11/5/2018 11:42 AM  
Dressing Status Clean, dry, & intact 11/5/2018 11:42 AM  
Dressing Type Transparent 11/5/2018 11:42 AM  
Hub Color/Line Status Pink;Patent; Flushed 11/5/2018 11:42 AM  
  
 
Opportunity for questions and clarification was provided. Patient transported with: 
 transport

## 2018-11-05 NOTE — CONSULTS
[address]    Tayla Vazquez  MR#: [id number]  : 1955  ACCOUNT #: [de-identified]   DATE OF SERVICE: 2018    Seen for ILA. Thanks Dr. Jaleel Santiago for the consult. She is a pleasant 59-year-old female with a few medical problems. She said she has been having cardiac workup to her weight loss surgery, which was scheduled later on this year. She had a cardiac catheterization on 2018 in the summer and she said it was okay. The issue is her PCP called her and he is part of PCU system. Because creatinine was 4.4 and  baseline creatinine over the summer was 1, and in our system it was 0.9 couple of years ago, at least 3 years ago. Came here, she said the blood pressure has been on the low side. She takes heavy dose of diclofenac and she was on ARB, feeling a little bit weak at times. No other general symptoms. PAST MEDICAL HISTORY:  Includes obesity, heavy NSAID abuse, gout, diabetes mellitus, asthma. PAST SURGICAL HISTORY:  Few orthopedic surgeries. REVIEW OF SYSTEMS:  According to HPI, rest is negative. ALLERGIES:  NO KNOWN ALLERGY. MEDICATIONS:  As outpatient includes metformin, aspirin, pravastatin, Cozaar, Neurontin, colchicine, allopurinol, diclofenac twice a day for the last few months at least.    SOCIAL HISTORY:  Denies smoking or drinking. PHYSICAL EXAMINATION:  VITAL SIGNS:  Blood pressure 118/56, satting 95%, obese. HEENT:  JVD is negative. No edema. ABDOMEN:  Soft, slightly distended. LABORATORY DATA:  I-STAT was done showed a potassium of 4.7, creatinine of 7.7, BUN of 89, ionized calcium of 1.03, hematocrit of 30. Rest of the testing is pending. IMPRESSION:  1. Acute kidney injury with fairly normal kidney function. So far. Suspect prerenal/NSAID/ARB with a creatinine went up to 4.4 last week and on i-STAT is 7.7.   We will need to send workup as long as the ultrasound is okay and no active urine sediments, we will give her IV fluid and then follow her kidney function. 2.  Status post cardiac catheterization on 08/02/2018. 3.  Heavy non-steroidal anti-inflammatory drug use. 4. On ARB. 5.  Obesity. RECOMMENDATIONS:  1.  IV fluid. 2.  Stop metformin, stop ARB, stop NSAIDs. 3.  Renal ultrasound ordered. 4.  Check CK. 5.  Check UA. 6.  Discussed at length with ER physician and the patient since patient had to be admitted. GERHARD OROURKE MD       WGA / MARCI  D: 11/05/2018 10:30     T: 11/05/2018 11:24  JOB #: [id number]

## 2018-11-05 NOTE — CONSULTS
Seen and examined  Thanks for the consult  A/P:  ILA,in the setting of lower BP,ARB,NSAID ;so far suspect prerenal  Heavy NSAID use  DM  Cardiac cath on 8.2018      IVF  Stop ARB,metformin,NSAID  Expand work up if active UA  Renal U/S  Discussed with her and ER MD

## 2018-11-05 NOTE — H&P
History and Physical 
 
Primary Care Provider: Mustapha Sharma MD 
 
Subjective:  
 
CC: ILA 'lab abnormality' Cierra Ruff is a 66 y.o. female with PMH of Gout, Arthritis, Diabetes, Hypertension, and Asthma who presents via EMS with chief complaint of abnormal lab results. Patient was seen by her PCP on 11/02/2018 for routine follow up, was called today for abnormal blood work showing decreased kidney function, and referred to [facility] ED for further evaluation. Patient has no new pain or discomfort, but notes feeling anxious and \"jittery\" since the call from her PCP. ecently had blood in stool described as \"bright red\" which she attributes to \"strain\" due to infrequent bowel movements, and she is in process of scheduling colonoscopy with GI. Reported using NSAIDs for ED, however denies recent use when I asked. In ED POC cr 7.7 She has upcoming gastric bypass surgery on December 5th. she lives at a private residence but has a care giver that lives with her. Patient denies the use of tobacco, alcohol, or elicit drugs. Pt denies fever, chills, cough, congestion, shortness of breath, chest pain, abdominal pain, nausea, vomiting, diarrhea, urinary frequency, hematuria, difficulty with urination or dysuria. Has frequently knee pains with arthritis Review of Systems: Further 10 point review of systems is benign. A comprehensive review of systems was negative except for that written in the History of Present Illness. Past Medical History:  
Diagnosis Date  Arthritis  Asthma  Cardiac murmur  Diabetes ([address]  Fibroid, uterine  Hypertension  Neurological disorder   
 neuropathy  Other ill-defined conditions(799.89)   
 gout  Sleep disorder Past Surgical History:  
Procedure Laterality Date  HX ORTHOPAEDIC    
 left shoulder - rotater cuff  HX ORTHOPAEDIC    
 HX TONSILLECTOMY [address] [facility]  LAP,TUBAL CAUTERY Prior to Admission medications Medication Sig Start Date End Date Taking? Authorizing Provider ALBUTEROL IN Take  by inhalation. Provider, Historical  
MECLIZINE by Does Not Apply route. Provider, Historical  
cholecalciferol (VITAMIN D3) 1,000 unit cap Take  by mouth daily. Provider, Historical  
metFORMIN (GLUCOPHAGE) 500 mg tablet Take 1,000 mg by mouth two (2) times a day. Provider, Historical  
AMLODIPINE BESYLATE (AMLODIPINE PO) Take  by mouth. Provider, Historical  
DOCUSATE CALCIUM (STOOL SOFTENER PO) Take  by mouth two (2) times a day. Provider, Historical  
SENNOSIDES (SENNA LAX PO) Take  by mouth two (2) times a day. Provider, Historical  
PNV/IRON,CARBONYL/DOCUSATE/FA (PRENA-CAP PO) Take  by mouth daily. Provider, Historical  
DOCUSATE SODIUM (DOCU PO) Take  by mouth. Provider, Historical  
aspirin delayed-release 81 mg tablet Take  by mouth daily. Provider, Historical  
traMADol (ULTRAM) 50 mg tablet Take 50 mg by mouth every six (6) hours as needed for Pain. Provider, Historical  
albuterol (PROVENTIL) 4 mg tablet Take  by mouth two (2) times a day. Provider, Historical  
pravastatin (PRAVACHOL) 20 mg tablet Take 20 mg by mouth nightly. Provider, Historical  
OMEGA-3 FATTY ACIDS/FISH OIL (OMEGA 3 FISH OIL PO) Take  by mouth. Provider, Historical  
GLUC GARCÍA/CHONDRO GARCÍA A/VIT C/MN (GLUCOSAMINE CHONDROITIN MAXSTR PO) Take  by mouth two (2) times a day. Provider, Historical  
losartan (COZAAR) 100 mg tablet Take 100 mg by mouth daily. Provider, Historical  
cyclobenzaprine (FLEXERIL) 10 mg tablet Take  by mouth three (3) times daily as needed for Muscle Spasm(s). Provider, Historical  
diclofenac EC (VOLTAREN) 75 mg EC tablet Take  by mouth. Provider, Historical  
gabapentin (NEURONTIN) 400 mg capsule Take 400 mg by mouth three (3) times daily.  Pt takes 400 mg BID    Provider, Historical  
 colchicine 0.6 mg tablet Take 0.6 mg by mouth daily. Provider, Historical  
penicillin v potassium (VEETID) 250 mg tablet  2/5/15   Other, MD May  
VENTOLIN HFA 90 mcg/actuation inhaler  2/22/15   Other, MD May  
allopurinol (ZYLOPRIM) 300 mg tablet daily. 2/7/15   Other, MD May  
amLODIPine (NORVASC) 10 mg tablet daily. 2/22/15   Other, MD May  
cyclobenzaprine (FLEXERIL) 10 mg tablet daily. 2/22/15   Other, MD May  
diclofenac EC (VOLTAREN) 75 mg EC tablet 75 mg two (2) times a day. 2/22/15   Other, MD May  
gabapentin (NEURONTIN) 400 mg capsule two (2) times a day. 2/6/15   Other, MD May  
GUAIFENESIN AC  mg/5 mL solution  2/22/15   Other, MD May  
losartan-hydrochlorothiazide (HYZAAR) 100-25 mg per tablet  2/17/15   Other, MD May  
PRENATAL PLUS, CALCIUM CARB, 27 mg iron- 1 mg tab  1/28/15   Other, MD May  
pravastatin (PRAVACHOL) 40 mg tablet  2/22/15   Other, MD May  
traMADol Brendan Maudlin) 50 mg tablet  2/23/15   Other, MD May  
triamcinolone acetonide (KENALOG) 0.1 % topical cream  2/22/15   Other, MD May  
metFORMIN (GLUCOPHAGE) 500 mg tablet Take 1 Tab by mouth two (2) times daily (with meals). 2/26/15   Flasschoen, Dalphine Casey P, PA  
ondansetron (ZOFRAN ODT) 8 mg disintegrating tablet Take 1 Tab by mouth every eight (8) hours as needed for Nausea. 2/26/15   Flasschoen, Orma Southerly, PA No Known Allergies Family History Problem Relation Age of Onset  Cancer Maternal Grandfather  Alcohol abuse Mother  Asthma Maternal Grandmother  Diabetes Paternal Grandmother SOCIAL HISTORY: 
Patient resides at Home. Patient ambulates with walking. Smoking history: previous Alcohol history: rarely Objective:  
 
Physical Exam:  
General:  Alert, oriented, No acute distress HEENT:  Pink conjunctivae, PERRL, hearing intact to voice, MM dry Neck:  Supple, without masses, thyroid non-tender Card:  S1, S2 without murmurs, good peripheral perfusion, No peripheral edema Resp:  No accessory muscle use, clear breath sounds without wheezes or rhonchi Abd:  Soft, non-tender, non-distended, BS+, no masses, morbidly obese Lymph:  No cervical or inguinal adenopathy Extremities:  No cyanosis or clubbing, no significant edema Skin:  skin turgor is good, acanthosis nigricans on axillae Neuro:  Grossly normal, no focal neuro deficits, CNs intact, follows commands Psych:  Good insight, oriented to person, place and time. Very anxious/jittery ECG:  pending Data Review: All diagnostic labs and studies have been reviewed. Imaging US renal pending Assessment:  
 
Active Problems: 
  Diabetes mellitus type 2, controlled ([facility] (11/11/2016) Asthma (11/11/2016) Gout (11/11/2016) Hypertension (11/11/2016) Sleep apnea (11/11/2016) Arthritis (11/11/2016) IAL (acute kidney injury) ([facility] (11/5/2018) Plan: #. ILA: significant, Cr on POC 7.7 from normal 3 months ago. - admit for observation to medical floor,  
- will get ESR, CRP, FLC, SPEP, UPEP, Urinalysis, US renal, urine eosinophils, 
- Monitor renal function closely, and blood pressure, ensure good hydration. R/o UTI/pyelonephritis. - Avoid all nephrotoxic agents. Monitor and correct electrolytes. - Nephrology following, already seen by nephro in ED. #. HTN: home regimen, monitor #. Arthritis: analgesia prn. #. Gout: check uric acid, home regimen #. Asthma: home regimen, prn DuoNebs. Monitor. DVT px: heparin Code status: Partial, DNR ok for critical care/ETT Dispo: ?home Signed By: Pati Everett MD   
 November 5, 2018

## 2018-11-05 NOTE — ED TRIAGE NOTES
Pt arrives via EMS from home for abnormal labs. Pt ambulatory to stretcher. Pt states kidneys are failing per PCP

## 2018-11-05 NOTE — PROGRESS NOTES
Admission Medication Reconciliation: 
 
Information obtained from:  Patient/Recent physician list summary/RxQuery Comments/Recommendations: Updated PTA meds/reviewed patient's allergies. 1)  Spoke to patient who brought in med list from recent physician visit summary (10/22). Patient appears to be a reliable historian. 2)  Medication changes (since last review): Added 
-acetaminophen 500 mg  
-levemir 
-labetalol 
-victoza 
-valsartan/hctz 
-diclofenac 1% topical gel Adjusted 
-prenatal vitamin 
-amlodipine 5 mg (vs 10 mg)  
-cholecalciferol (3000 units daily) 
-omega-3 (2 tabs daily) 
-pravastatin 40 mg (vs 20 mg) 
-tramadol 50 mg TID  
-ventolin inhaler (2 puffs QID prn wheezing) Removed -albuterol 4 mg tab 
-colchicine 0.6 mg  
-docusate sodium and docusate calcium 
-glucosamine 
-guaifenesin  
-losartan 100 mg 
-losartan/hctz 100-25 mg 
-meclizine 
-ondansetron 8 mg  
-penicillin 250 mg  
-sennosides  
-triamcinolone 0.1% cream  
  
 
Allergies:  Patient has no known allergies. Significant PMH/Disease States:  
Past Medical History:  
Diagnosis Date  Arthritis  Asthma  Cardiac murmur  Diabetes ([facility]  Fibroid, uterine  Hypertension  Neurological disorder   
 neuropathy  Other ill-defined conditions(799.89)   
 gout  Sleep disorder Chief Complaint for this Admission: Chief Complaint Patient presents with  Abnormal Lab Results Prior to Admission Medications:  
Prior to Admission Medications Prescriptions Last Dose Informant Patient Reported? Taking? Liraglutide (VICTOZA) 0.6 mg/0.1 mL (18 mg/3 mL) pnij 11/4/2018 at Unknown time  Yes Yes Sig: by SubCUTAneous route daily. Dosing varies based on blood glucose level OMEGA-3 FATTY ACIDS/FISH OIL (OMEGA 3 FISH OIL PO) 11/4/2018 at Unknown time  Yes Yes Sig: Take 2 Tabs by mouth daily. VENTOLIN HFA 90 mcg/actuation inhaler   Yes Yes Sig: Take 2 Puffs by inhalation every six (6) hours as needed for Wheezing. acetaminophen (MAPAP) 500 mg capsule 2018 at Unknown time  Yes Yes Sig: Take 1,000 mg by mouth two (2) times a day. allopurinol (ZYLOPRIM) 300 mg tablet 2018 at Unknown time  Yes Yes Sig: Take 300 mg by mouth daily. amLODIPine (NORVASC) 5 mg tablet 2018 at Unknown time  Yes Yes Sig: Take 5 mg by mouth daily. aspirin delayed-release 81 mg tablet 2018 at Unknown time  Yes Yes Sig: Take 81 mg by mouth daily. cholecalciferol (VITAMIN D3) 1,000 unit cap 2018 at Unknown time  Yes Yes Sig: Take 3,000 Units by mouth daily. cyclobenzaprine (FLEXERIL) 10 mg tablet   Yes Yes Sig: Take 10 mg by mouth three (3) times daily as needed for Muscle Spasm(s). diclofenac (VOLTAREN) 1 % gel   Yes Yes Sig: Apply  to affected area. diclofenac EC (VOLTAREN) 75 mg EC tablet 2018 at Unknown time  Yes Yes Si mg two (2) times a day.  
gabapentin (NEURONTIN) 400 mg capsule 2018 at Unknown time  Yes Yes Si mg two (2) times a day. insulin detemir U-100 (LEVEMIR U-100 INSULIN) 100 unit/mL injection 2018 at Unknown time  Yes Yes Si Units by SubCUTAneous route every evening. labetalol (NORMODYNE) 100 mg tablet 2018 at Unknown time  Yes Yes Sig: Take 100 mg by mouth two (2) times a day. metFORMIN (GLUCOPHAGE) 500 mg tablet 2018 at Unknown time  Yes Yes Sig: Take 1,000 mg by mouth two (2) times a day. pravastatin (PRAVACHOL) 40 mg tablet 2018 at Unknown time  Yes Yes Sig: Take 40 mg by mouth daily. prenatal no122/iron/folic acid (PRENATAL MULTI PO) 2018 at Unknown time  Yes Yes Sig: Take 1 Cap by mouth daily. Prenatal, multivitamin w/folic acid 1 mg  
traMADol (ULTRAM) 50 mg tablet 2018 at Unknown time  Yes Yes Sig: Take 100 mg by mouth three (3) times daily. Takes in morning, evening, and at night valsartan-hydroCHLOROthiazide (DIOVAN-HCT) 320-25 mg per tablet 11/4/2018 at Unknown time  Yes Yes Sig: Take 1 Tab by mouth daily. Facility-Administered Medications: None

## 2018-11-06 LAB
ANION GAP SERPL CALC-SCNC: 15 MMOL/L (ref 5–15)
BUN SERPL-MCNC: 97 MG/DL (ref 6–20)
BUN/CREAT SERPL: 16 (ref 12–20)
CALCIUM SERPL-MCNC: 8.1 MG/DL (ref 8.5–10.1)
CHLORIDE SERPL-SCNC: 107 MMOL/L (ref 97–108)
CO2 SERPL-SCNC: 17 MMOL/L (ref 21–32)
CREAT SERPL-MCNC: 6.14 MG/DL (ref 0.55–1.02)
FERRITIN SERPL-MCNC: 124 NG/ML (ref 8–252)
FOLATE SERPL-MCNC: 64 NG/ML (ref 5–21)
GLUCOSE BLD STRIP.AUTO-MCNC: 134 MG/DL (ref 65–100)
GLUCOSE BLD STRIP.AUTO-MCNC: 137 MG/DL (ref 65–100)
GLUCOSE SERPL-MCNC: 108 MG/DL (ref 65–100)
IRON SATN MFR SERPL: 8 % (ref 20–50)
IRON SERPL-MCNC: 22 UG/DL (ref 35–150)
POTASSIUM SERPL-SCNC: 4.3 MMOL/L (ref 3.5–5.1)
SERVICE CMNT-IMP: ABNORMAL
SERVICE CMNT-IMP: ABNORMAL
SODIUM SERPL-SCNC: 139 MMOL/L (ref 136–145)
TIBC SERPL-MCNC: 261 UG/DL (ref 250–450)
VIT B12 SERPL-MCNC: 464 PG/ML (ref 193–986)

## 2018-11-06 PROCEDURE — 74011250637 HC RX REV CODE- 250/637: Performed by: INTERNAL MEDICINE

## 2018-11-06 PROCEDURE — 82784 ASSAY IGA/IGD/IGG/IGM EACH: CPT | Performed by: INTERNAL MEDICINE

## 2018-11-06 PROCEDURE — 82728 ASSAY OF FERRITIN: CPT | Performed by: INTERNAL MEDICINE

## 2018-11-06 PROCEDURE — 74011250636 HC RX REV CODE- 250/636: Performed by: INTERNAL MEDICINE

## 2018-11-06 PROCEDURE — 82962 GLUCOSE BLOOD TEST: CPT

## 2018-11-06 PROCEDURE — 82607 VITAMIN B-12: CPT | Performed by: INTERNAL MEDICINE

## 2018-11-06 PROCEDURE — 83540 ASSAY OF IRON: CPT | Performed by: INTERNAL MEDICINE

## 2018-11-06 PROCEDURE — 36415 COLL VENOUS BLD VENIPUNCTURE: CPT | Performed by: INTERNAL MEDICINE

## 2018-11-06 PROCEDURE — 83883 ASSAY NEPHELOMETRY NOT SPEC: CPT | Performed by: INTERNAL MEDICINE

## 2018-11-06 PROCEDURE — 74011636637 HC RX REV CODE- 636/637: Performed by: INTERNAL MEDICINE

## 2018-11-06 PROCEDURE — 99218 HC RM OBSERVATION: CPT

## 2018-11-06 PROCEDURE — 80048 BASIC METABOLIC PNL TOTAL CA: CPT | Performed by: INTERNAL MEDICINE

## 2018-11-06 PROCEDURE — 82746 ASSAY OF FOLIC ACID SERUM: CPT | Performed by: INTERNAL MEDICINE

## 2018-11-06 RX ORDER — SODIUM BICARBONATE 650 MG/1
650 TABLET ORAL 2 TIMES DAILY
Status: DISCONTINUED | OUTPATIENT
Start: 2018-11-06 | End: 2018-11-08 | Stop reason: HOSPADM

## 2018-11-06 RX ADMIN — HEPARIN SODIUM 5000 UNITS: 5000 INJECTION INTRAVENOUS; SUBCUTANEOUS at 16:10

## 2018-11-06 RX ADMIN — SODIUM CHLORIDE 150 ML/HR: 900 INJECTION, SOLUTION INTRAVENOUS at 03:53

## 2018-11-06 RX ADMIN — Medication 10 ML: at 14:00

## 2018-11-06 RX ADMIN — PRAVASTATIN SODIUM 40 MG: 40 TABLET ORAL at 09:57

## 2018-11-06 RX ADMIN — HEPARIN SODIUM 5000 UNITS: 5000 INJECTION INTRAVENOUS; SUBCUTANEOUS at 22:12

## 2018-11-06 RX ADMIN — TRAMADOL HYDROCHLORIDE 100 MG: 50 TABLET, FILM COATED ORAL at 09:58

## 2018-11-06 RX ADMIN — Medication 10 ML: at 20:47

## 2018-11-06 RX ADMIN — ALLOPURINOL 300 MG: 300 TABLET ORAL at 09:57

## 2018-11-06 RX ADMIN — INSULIN GLARGINE 40 UNITS: 100 INJECTION, SOLUTION SUBCUTANEOUS at 22:12

## 2018-11-06 RX ADMIN — ASPIRIN 81 MG: 81 TABLET, COATED ORAL at 09:57

## 2018-11-06 RX ADMIN — TRAMADOL HYDROCHLORIDE 100 MG: 50 TABLET, FILM COATED ORAL at 22:12

## 2018-11-06 RX ADMIN — TRAMADOL HYDROCHLORIDE 100 MG: 50 TABLET, FILM COATED ORAL at 16:10

## 2018-11-06 RX ADMIN — GABAPENTIN 400 MG: 100 CAPSULE ORAL at 07:13

## 2018-11-06 RX ADMIN — HYDROCODONE BITARTRATE AND ACETAMINOPHEN 1 TABLET: 5; 325 TABLET ORAL at 14:25

## 2018-11-06 RX ADMIN — SODIUM BICARBONATE 650 MG: 650 TABLET ORAL at 17:13

## 2018-11-06 RX ADMIN — LABETALOL HYDROCHLORIDE 100 MG: 100 TABLET, FILM COATED ORAL at 17:13

## 2018-11-06 RX ADMIN — SODIUM BICARBONATE 650 MG: 650 TABLET ORAL at 09:58

## 2018-11-06 RX ADMIN — HYDROCODONE BITARTRATE AND ACETAMINOPHEN 1 TABLET: 5; 325 TABLET ORAL at 06:23

## 2018-11-06 RX ADMIN — GABAPENTIN 400 MG: 100 CAPSULE ORAL at 20:45

## 2018-11-06 NOTE — PROGRESS NOTES
Bedside shift change report given to [address] (oncoming nurse) by [address] (offgoing nurse). Report included the following information SBAR, Kardex, ED Summary, Intake/Output, MAR and Recent Results. Primary Nurse Gaye Gutierrez and Av Reveles, RN performed a dual skin assessment on this patient No impairment noted Janak score is 21 Paged hospitalist for patient bg 100 with scheduled order for 40 units lantus at 2200; per NP Self, hold lantus for tonight.

## 2018-11-06 NOTE — PROGRESS NOTES
RENAL  PROGRESS NOTE Subjective: She feels well;she remembers today she start a 'diet pill' 2 months ago that she ordered online Objective: VITALS SIGNS:   
Visit Vitals /61 (BP 1 Location: Left arm) Pulse 91 Temp 97.9 °F (36.6 °C) Resp 18 Ht 5' 5\" (1.651 m) Wt 132.6 kg (292 lb 5.3 oz) LMP 2006 SpO2 95% BMI 48.65 kg/m² O2 Device: Room air Temp (24hrs), Av.2 °F (36.8 °C), Min:97.9 °F (36.6 °C), Max:98.7 °F (37.1 °C) PHYSICAL EXAM: 
No edema NAD 
 
DATA REVIEW:  
 
INTAKE / OUTPUT:  
Last shift:      No intake/output data recorded. Last 3 shifts:  1901 -  0700 In: 2678 [P.O.:400; I.V.:2278] Out: 150 [Urine:150] Intake/Output Summary (Last 24 hours) at 2018 8474 Last data filed at 2018 1853 Gross per 24 hour Intake 2678 ml Output 150 ml Net 2528 ml LABS:  
Recent Labs 18 
5677 WBC 9.5 HGB 9.7* HCT 32.0*  
 Recent Labs 18 
0518 18 
2304  136  
K 4.3 4.4  
 102 CO2 17* 21 * 76 BUN 97* 100* CREA 6.14* 7.52* CA 8.1* 9.1 ALB  --  3.4* TBILI  --  0.5 SGOT  --  15 ALT  --  20 Assessment:  
 
ILA,in the setting of lower BP,ARB,NSAID ; expect creat to improve 'much better' if only pre-renal;worry about ischemic ATN,AIN Met acidosis Heavy NSAID use DM Cardiac cath on  Plan: Po bic STOP DICLOFENAC Decrease IVF Will get the name of her 'diet medication 'she had been taking Carmelina Client MARKOS. ..pending Discussed with her Светлана Mckeon MD

## 2018-11-06 NOTE — DIABETES MGMT
DTC Consult Note Recommendations/ Comments: If appropriate, please consider discontinuing pre-meal until blood sugar levels can be assessed (no result since chemistry at 0518). Message sent to Dr. Delphine Ferguson regarding above. Current hospital DM medication: Lantus 40 units, Lispro pre-meal, 7 units tid ac and for correction, resistant scale Consult received for:  [x]             Assessment of home management Chart reviewed and initial evaluation complete on [facility]. Patient is a 66 y.o. female with a history of diabetes on oral agent (monotherapy): metformin (generic) 
insulin injections: Levemir 48 units and Victoza at home. BG monitoring at home 1 time per day. Patient reports BG levels at home trend: stable. She drinks some sweet drinks, so discussed alternatives. She declined outpatient education. Assessed and instructed patient on the following:  
·  interpretation of lab results, blood sugar goals, complications of diabetes mellitus, hypoglycemia prevention and treatment, insulin adjustments and nutrition Encouraged the following:  
· dietary modifications: eliminate sweet drinks, regular blood sugar monitorin times daily Provided patient with the following: [x]             Survival skills education materials [x]             Outpatient DTC contact number Discussed with patient and/or family need for follow up appointment for diabetes management after discharge. A1c: Pending No results found for: HBA1C, HGBE8, [id number] Recent Glucose Results:  
Lab Results Component Value Date/Time  (H) 2018 05:18 AM  
 GLUCPOC 100 2018 09:48 PM  
 GLUCPOC 90 2018 05:17 PM  
  
 
Lab Results Component Value Date/Time Creatinine 6.14 (H) 2018 05:18 AM  
 
Estimated Creatinine Clearance: 13.2 mL/min (A) (based on SCr of 6.14 mg/dL (H)). Active Orders Diet DIET DIABETIC CONSISTENT CARB Regular; 70-70-70 (House) PO intake:  
Patient Vitals for the past 72 hrs: 
 % Diet Eaten 11/05/18 1831 100 % Will continue to follow as needed. Thank you.  
Suma Napier, MS, RN, CDE

## 2018-11-06 NOTE — PROGRESS NOTES
Spoke with Dr. Shannan Buerger regarding overnight events of pt's low BP; this am 106/61; per Dr. Shannan Buerger hold all BP medications this morning.

## 2018-11-06 NOTE — PROGRESS NOTES
Hospitalist Progress Note Feliberto Gee MD 
Answering service: [phone] OR [phone] from in house phone Cell: [phone] Date of Service:  2018 NAME:  Sheridan Bajwa :  1955 MRN:  [id number] Admission Summary:  
Sheridan Bajwa is a 66 y.o. female with PMH of Gout, Arthritis, Diabetes, Hypertension, and Asthma who presents via EMS with chief complaint of abnormal lab results. Patient was seen by her PCP on 2018 for routine follow up, was called today for abnormal blood work showing decreased kidney function, and referred to [facility] ED for further evaluation. Patient has no new pain or discomfort, but notes feeling anxious and \"jittery\" since the call from her PCP. ecently had blood in stool described as \"bright red\" which she attributes to \"strain\" due to infrequent bowel movements, and she is in process of scheduling colonoscopy with GI. Reported using NSAIDs for ED Interval history / Subjective: She said she feels better, she said she has been taking lipozon tabs po bid for last 2 months for  weight loss Assessment & Plan: ILA  
-on normal saline at 150 ml/hr 
-creatinine improving, cr 6.14 from 7.51 
-ESR, CRP, SPEP, UPEP pending 
-US of kidney no hydronephrosis or large shadowing calculus. -Nephrologist on board DM-II 
-on lantus 40 units, lispro 7 units, sliding scale and monitor finger stick glucose 
-check A1c Normocytic anemia  
-Hgb 9.7 
-iron profile low 
-check stool for hem occult HTN 
-BP low normal, hold amlodipine, labetalol and monitor BP Hx of neuropathy  
-on gabapentin Hx of gout 
-continue allopurinol Hx of asthma 
-stable Code status: Full Code DVT prophylaxis: Heparin Care Plan discussed with: Patient/Family, Nurse and  Disposition: TBD Hospital Problems  Date Reviewed: 2017 Codes Class Noted POA * (Principal) ILA (acute kidney injury) ([facility] ICD-10-CM: N17.9 ICD-9-CM: 584.9  11/5/2018 Yes Diabetes mellitus type 2, controlled ([facility] ICD-10-CM: E11.9 ICD-9-CM: 250.00  11/11/2016 Yes Asthma ICD-10-CM: B94.889 ICD-9-CM: 493.90  11/11/2016 Yes Gout ICD-10-CM: M10.9 ICD-9-CM: 274.9  11/11/2016 Yes Hypertension ICD-10-CM: I10 
ICD-9-CM: 401.9  11/11/2016 Yes Sleep apnea ICD-10-CM: G47.30 ICD-9-CM: 780.57  11/11/2016 Yes Arthritis ICD-10-CM: M19.90 ICD-9-CM: 716.90  11/11/2016 Yes Vital Signs:  
 Last 24hrs VS reviewed since prior progress note. Most recent are: 
Visit Vitals BP 90/57 (BP 1 Location: Left arm, BP Patient Position: At rest) Pulse 84 Temp 98.1 °F (36.7 °C) Resp 18 Ht 5' 5\" (1.651 m) Wt 132.6 kg (292 lb 5.3 oz) SpO2 96% BMI 48.65 kg/m² Intake/Output Summary (Last 24 hours) at 11/6/2018 0745 Last data filed at 11/6/2018 9980 Gross per 24 hour Intake 2678 ml Output 150 ml Net 2528 ml Physical Examination:  
 
 
     
Constitutional:  No acute distress, cooperative, pleasant   
ENT:  Oral mucous moist, oropharynx benign. Neck supple, Resp:  CTA bilaterally. No wheezing/rhonchi/rales. No accessory muscle use CV:  Regular rhythm, normal rate, no murmurs, gallops, rubs GI:  Soft, non distended, non tender. normoactive bowel sounds, no hepatosplenomegaly Musculoskeletal:  No edema Neurologic:  Moves all extremities. AAOx3, CN II-XII reviewed Skin:  Good turgor, no rashes or ulcers Data Review:  
 Review and/or order of clinical lab test 
 
 
Labs:  
 
Recent Labs 11/05/18 
2876 WBC 9.5 HGB 9.7* HCT 32.0*  
 Recent Labs 11/06/18 
0518 11/05/18 
6306  136  
K 4.3 4.4  
 102 CO2 17* 21 BUN 97* 100* CREA 6.14* 7.52* * 76  
CA 8.1* 9.1 Recent Labs 11/05/18 
0971 SGOT 15 ALT 20 AP 78 TBILI 0.5 TP 8.0 ALB 3.4*  
 GLOB 4.6* No results for input(s): INR, PTP, APTT in the last 72 hours. No lab exists for component: INREXT Recent Labs 11/06/18 
0518 TIBC 261 PSAT 8*  
FERR 124 Lab Results Component Value Date/Time Folate 64.0 (H) 11/06/2018 05:18 AM  
  
No results for input(s): PH, PCO2, PO2 in the last 72 hours. Recent Labs 11/05/18 
6776  No results found for: CHOL, CHOLX, CHLST, CHOLV, HDL, LDL, LDLC, DLDLP, TGLX, TRIGL, TRIGP, CHHD, CHHDX Lab Results Component Value Date/Time Glucose (POC) 100 11/05/2018 09:48 PM  
 Glucose (POC) 90 11/05/2018 05:17 PM  
 Glucose (POC) 84 11/05/2018 10:14 AM  
 Glucose (POC) 337 (H) 02/26/2015 02:06 PM  
 Glucose (POC) 371 (H) 02/26/2015 01:08 PM  
 Glucose (POC) 519 (H) 02/26/2015 11:56 AM  
 Glucose (POC) 455 (H) 02/26/2015 11:30 AM  
 
Lab Results Component Value Date/Time Color YELLOW/STRAW 11/05/2018 02:28 PM  
 Appearance CLOUDY (A) 11/05/2018 02:28 PM  
 Specific gravity 1.024 11/05/2018 02:28 PM  
 Specific gravity 1.025 02/26/2015 12:00 PM  
 pH (UA) 5.0 11/05/2018 02:28 PM  
 Protein NEGATIVE  11/05/2018 02:28 PM  
 Glucose NEGATIVE  11/05/2018 02:28 PM  
 Ketone NEGATIVE  11/05/2018 02:28 PM  
 Bilirubin NEGATIVE  02/26/2015 12:00 PM  
 Urobilinogen 1.0 11/05/2018 02:28 PM  
 Nitrites NEGATIVE  11/05/2018 02:28 PM  
 Leukocyte Esterase MODERATE (A) 11/05/2018 02:28 PM  
 Epithelial cells MANY (A) 11/05/2018 02:28 PM  
 Bacteria 1+ (A) 11/05/2018 02:28 PM  
 WBC 5-10 11/05/2018 02:28 PM  
 RBC 0-5 11/05/2018 02:28 PM  
 
 
 
Medications Reviewed:  
 
Current Facility-Administered Medications Medication Dose Route Frequency  0.9% sodium chloride infusion  150 mL/hr IntraVENous CONTINUOUS  
 sodium chloride (NS) flush 5-10 mL  5-10 mL IntraVENous Q8H  
 sodium chloride (NS) flush 5-10 mL  5-10 mL IntraVENous PRN  
 HYDROcodone-acetaminophen (NORCO) 5-325 mg per tablet 1 Tab  1 Tab Oral Q4H PRN  
  ondansetron (ZOFRAN) injection 4 mg  4 mg IntraVENous Q4H PRN  
 heparin (porcine) injection 5,000 Units  5,000 Units SubCUTAneous Q8H  
 allopurinol (ZYLOPRIM) tablet 300 mg  300 mg Oral DAILY  amLODIPine (NORVASC) tablet 5 mg  5 mg Oral DAILY  aspirin delayed-release tablet 81 mg  81 mg Oral DAILY  cyclobenzaprine (FLEXERIL) tablet 10 mg  10 mg Oral TID PRN  
 . PHARMACY TO SUBSTITUTE PER PROTOCOL (Reordered from: diclofenac (VOLTAREN) 1 % gel)    Per Protocol  gabapentin (NEURONTIN) capsule 400 mg  400 mg Oral BID  diclofenac EC (VOLTAREN) tablet 75 mg  75 mg Oral BID  labetalol (NORMODYNE) tablet 100 mg  100 mg Oral BID  pravastatin (PRAVACHOL) tablet 40 mg  40 mg Oral DAILY  traMADol (ULTRAM) tablet 100 mg  100 mg Oral TID  albuterol (PROVENTIL VENTOLIN) nebulizer solution 2.5 mg  2.5 mg Nebulization Q6H PRN  
 glucose chewable tablet 16 g  4 Tab Oral PRN  
 dextrose (D50W) injection syrg 12.5-25 g  25-50 mL IntraVENous PRN  
 glucagon (GLUCAGEN) injection 1 mg  1 mg IntraMUSCular PRN  
 insulin lispro (HUMALOG) injection   SubCUTAneous AC&HS  insulin glargine (LANTUS) injection 40 Units  0.3 Units/kg SubCUTAneous QHS  insulin lispro (HUMALOG) injection 7 Units  0.05 Units/kg SubCUTAneous TID WITH MEALS  
 
______________________________________________________________________ EXPECTED LENGTH OF STAY: - - - 
ACTUAL LENGTH OF STAY:          0 Darlina Gaucher, MD

## 2018-11-06 NOTE — PROGRESS NOTES
Spiritual Care Partner Volunteer visited patient in [facility] on 11/6/18. Documented by: Chaplain Apodaca MDiv, MACE 
287 PRAIVON ([id number])

## 2018-11-06 NOTE — PROGRESS NOTES
11/05/18 2106 Vitals Temp 98.7 °F (37.1 °C) Temp Source Oral  
Pulse (Heart Rate) 100 Heart Rate Source Monitor Resp Rate 18  
O2 Sat (%) 98 % Level of Consciousness Alert  
BP (!) 79/60 MAP (Calculated) 66 BP 1 Location Left arm BP 1 Method Automatic  
BP Patient Position At rest  
MEWS Score 3  
 
 
2123 - paged hospitalist 
2123 - per farhad, 500cc bolus and increase continuous NS infusion to 150/hr

## 2018-11-07 LAB
ALBUMIN SERPL-MCNC: 2.6 G/DL (ref 3.5–5)
ALBUMIN/GLOB SERPL: 0.7 {RATIO} (ref 1.1–2.2)
ALP SERPL-CCNC: 66 U/L (ref 45–117)
ALT SERPL-CCNC: 19 U/L (ref 12–78)
ANION GAP SERPL CALC-SCNC: 12 MMOL/L (ref 5–15)
AST SERPL-CCNC: 13 U/L (ref 15–37)
BILIRUB SERPL-MCNC: 0.3 MG/DL (ref 0.2–1)
BUN SERPL-MCNC: 88 MG/DL (ref 6–20)
BUN/CREAT SERPL: 21 (ref 12–20)
CALCIUM SERPL-MCNC: 8.7 MG/DL (ref 8.5–10.1)
CHLORIDE SERPL-SCNC: 106 MMOL/L (ref 97–108)
CO2 SERPL-SCNC: 19 MMOL/L (ref 21–32)
CREAT SERPL-MCNC: 4.24 MG/DL (ref 0.55–1.02)
EOSINOPHIL #/AREA URNS HPF: NEGATIVE /[HPF]
ERYTHROCYTE [DISTWIDTH] IN BLOOD BY AUTOMATED COUNT: 14.3 % (ref 11.5–14.5)
EST. AVERAGE GLUCOSE BLD GHB EST-MCNC: 105 MG/DL
GLOBULIN SER CALC-MCNC: 3.8 G/DL (ref 2–4)
GLUCOSE BLD STRIP.AUTO-MCNC: 119 MG/DL (ref 65–100)
GLUCOSE BLD STRIP.AUTO-MCNC: 120 MG/DL (ref 65–100)
GLUCOSE BLD STRIP.AUTO-MCNC: 135 MG/DL (ref 65–100)
GLUCOSE BLD STRIP.AUTO-MCNC: 141 MG/DL (ref 65–100)
GLUCOSE SERPL-MCNC: 117 MG/DL (ref 65–100)
HBA1C MFR BLD: 5.3 % (ref 4.2–6.3)
HCT VFR BLD AUTO: 26.7 % (ref 35–47)
HGB BLD-MCNC: 8.2 G/DL (ref 11.5–16)
IGA SERPL-MCNC: 82 MG/DL (ref 87–352)
IGG SERPL-MCNC: 725 MG/DL (ref 700–1600)
IGM SERPL-MCNC: 37 MG/DL (ref 26–217)
KAPPA LC FREE SER-MCNC: 59.7 MG/L (ref 3.3–19.4)
KAPPA LC FREE/LAMBDA FREE SER: 1.21 {RATIO} (ref 0.26–1.65)
LAMBDA LC FREE SERPL-MCNC: 49.4 MG/L (ref 5.7–26.3)
MCH RBC QN AUTO: 29.4 PG (ref 26–34)
MCHC RBC AUTO-ENTMCNC: 30.7 G/DL (ref 30–36.5)
MCV RBC AUTO: 95.7 FL (ref 80–99)
NRBC # BLD: 0 K/UL (ref 0–0.01)
NRBC BLD-RTO: 0 PER 100 WBC
PLATELET # BLD AUTO: 287 K/UL (ref 150–400)
PMV BLD AUTO: 10.9 FL (ref 8.9–12.9)
POTASSIUM SERPL-SCNC: 4.3 MMOL/L (ref 3.5–5.1)
PROT PATTERN SERPL IFE-IMP: ABNORMAL
PROT SERPL-MCNC: 6.4 G/DL (ref 6.4–8.2)
RBC # BLD AUTO: 2.79 M/UL (ref 3.8–5.2)
SERVICE CMNT-IMP: ABNORMAL
SODIUM SERPL-SCNC: 137 MMOL/L (ref 136–145)
WBC # BLD AUTO: 7.4 K/UL (ref 3.6–11)

## 2018-11-07 PROCEDURE — 65270000032 HC RM SEMIPRIVATE

## 2018-11-07 PROCEDURE — 99218 HC RM OBSERVATION: CPT

## 2018-11-07 PROCEDURE — 36415 COLL VENOUS BLD VENIPUNCTURE: CPT | Performed by: HOSPITALIST

## 2018-11-07 PROCEDURE — 82962 GLUCOSE BLOOD TEST: CPT

## 2018-11-07 PROCEDURE — 74011636637 HC RX REV CODE- 636/637: Performed by: INTERNAL MEDICINE

## 2018-11-07 PROCEDURE — 80053 COMPREHEN METABOLIC PANEL: CPT | Performed by: HOSPITALIST

## 2018-11-07 PROCEDURE — 74011250636 HC RX REV CODE- 250/636: Performed by: INTERNAL MEDICINE

## 2018-11-07 PROCEDURE — 74011250637 HC RX REV CODE- 250/637: Performed by: INTERNAL MEDICINE

## 2018-11-07 PROCEDURE — 87205 SMEAR GRAM STAIN: CPT | Performed by: HOSPITALIST

## 2018-11-07 PROCEDURE — 85027 COMPLETE CBC AUTOMATED: CPT | Performed by: HOSPITALIST

## 2018-11-07 PROCEDURE — 83036 HEMOGLOBIN GLYCOSYLATED A1C: CPT | Performed by: INTERNAL MEDICINE

## 2018-11-07 RX ADMIN — AMLODIPINE BESYLATE 5 MG: 5 TABLET ORAL at 09:44

## 2018-11-07 RX ADMIN — Medication 10 ML: at 07:09

## 2018-11-07 RX ADMIN — LABETALOL HYDROCHLORIDE 100 MG: 100 TABLET, FILM COATED ORAL at 09:44

## 2018-11-07 RX ADMIN — GABAPENTIN 400 MG: 100 CAPSULE ORAL at 21:40

## 2018-11-07 RX ADMIN — TRAMADOL HYDROCHLORIDE 100 MG: 50 TABLET, FILM COATED ORAL at 17:18

## 2018-11-07 RX ADMIN — HEPARIN SODIUM 5000 UNITS: 5000 INJECTION INTRAVENOUS; SUBCUTANEOUS at 07:09

## 2018-11-07 RX ADMIN — TRAMADOL HYDROCHLORIDE 100 MG: 50 TABLET, FILM COATED ORAL at 21:40

## 2018-11-07 RX ADMIN — SODIUM BICARBONATE 650 MG: 650 TABLET ORAL at 09:44

## 2018-11-07 RX ADMIN — HEPARIN SODIUM 5000 UNITS: 5000 INJECTION INTRAVENOUS; SUBCUTANEOUS at 14:40

## 2018-11-07 RX ADMIN — PRAVASTATIN SODIUM 40 MG: 40 TABLET ORAL at 09:44

## 2018-11-07 RX ADMIN — TRAMADOL HYDROCHLORIDE 100 MG: 50 TABLET, FILM COATED ORAL at 09:45

## 2018-11-07 RX ADMIN — SODIUM CHLORIDE 75 ML/HR: 900 INJECTION, SOLUTION INTRAVENOUS at 14:40

## 2018-11-07 RX ADMIN — SODIUM BICARBONATE 650 MG: 650 TABLET ORAL at 17:18

## 2018-11-07 RX ADMIN — HYDROCODONE BITARTRATE AND ACETAMINOPHEN 1 TABLET: 5; 325 TABLET ORAL at 07:10

## 2018-11-07 RX ADMIN — ALLOPURINOL 300 MG: 300 TABLET ORAL at 09:44

## 2018-11-07 RX ADMIN — GABAPENTIN 400 MG: 100 CAPSULE ORAL at 07:09

## 2018-11-07 RX ADMIN — Medication 10 ML: at 14:40

## 2018-11-07 RX ADMIN — Medication 10 ML: at 21:40

## 2018-11-07 RX ADMIN — HEPARIN SODIUM 5000 UNITS: 5000 INJECTION INTRAVENOUS; SUBCUTANEOUS at 21:40

## 2018-11-07 RX ADMIN — HYDROCODONE BITARTRATE AND ACETAMINOPHEN 1 TABLET: 5; 325 TABLET ORAL at 20:21

## 2018-11-07 RX ADMIN — SODIUM CHLORIDE 75 ML/HR: 900 INJECTION, SOLUTION INTRAVENOUS at 01:18

## 2018-11-07 RX ADMIN — ASPIRIN 81 MG: 81 TABLET, COATED ORAL at 09:45

## 2018-11-07 RX ADMIN — INSULIN GLARGINE 40 UNITS: 100 INJECTION, SOLUTION SUBCUTANEOUS at 21:43

## 2018-11-07 NOTE — PROGRESS NOTES
RENAL  PROGRESS NOTE Subjective: She feels well;she said she is on lipozeme as dietary pills Objective: VITALS SIGNS:   
Visit Vitals /66 (BP 1 Location: Left arm, BP Patient Position: At rest) Pulse 92 Temp 98.3 °F (36.8 °C) Resp 16 Ht 5' 5\" (1.651 m) Wt 132.6 kg (292 lb 5.3 oz) LMP 2006 SpO2 97% BMI 48.65 kg/m² O2 Device: Room air Temp (24hrs), Av.3 °F (36.8 °C), Min:98.1 °F (36.7 °C), Max:98.4 °F (36.9 °C) PHYSICAL EXAM: 
No edema NAD 
 
DATA REVIEW:  
 
INTAKE / OUTPUT:  
Last shift:      701 - 1900 In: 240 [P.O.:240] Out: - Last 3 shifts: 1901 - 700 In: 4398 [P.O.:720; I.V.:3028] Out: 500 [Urine:500] Intake/Output Summary (Last 24 hours) at 2018 1153 Last data filed at 2018 8178 Gross per 24 hour Intake 1470 ml Output 300 ml Net 1170 ml LABS:  
Recent Labs 18 
0238 18 
4579 WBC 7.4 9.5 HGB 8.2* 9.7* HCT 26.7* 32.0*  
 297 Recent Labs 18 
8253 18 
0518 18 
4923  139 136  
K 4.3 4.3 4.4  
 107 102 CO2 19* 17* 21 * 108* 76 BUN 88* 97* 100* CREA 4.24* 6.14* 7.52* CA 8.7 8.1* 9.1 ALB 2.6*  --  3.4* TBILI 0.3  --  0.5 SGOT 13*  --  15 ALT 19  --  20 Assessment:  
 
ILA,in the setting of lower BP,ARB,NSAID ; expect creat to improve 'much better' if only pre-renal;worry about ischemic ATN,AIN Met acidosis Heavy NSAID use DM Cardiac cath on  Plan:  
 dc NSAID Can dc home from renal standpoint Dc dietary suppl Follow up next week in renal office Discussed with her Ashley Luevano MD

## 2018-11-07 NOTE — PHYSICIAN ADVISORY
Letter of Status Determination:  
Recommend hospitalization status upgraded from OBSERVATION  to INPATIENT  Status Pt Name:  Felicita Aschoff MR#  
JERRY # [id number] / 
[id number] Payor: Chiara Charmaine [address] / Plan: [address] / Product Type: Managed Care Medicaid /   
CSN#  [id number] [facility]/  @ [facility]  
Hospitalization date  11/5/2018  9:38 AM  
Current Attending Physician  Phong Zelaya MD  
Principal diagnosis  ILA (acute kidney injury) ([facility] Haley Jansen JERMAINE Ennis is a 66 y.o. female with PMH of Gout, Arthritis, Diabetes, Hypertension, and Asthma who presents via EMS with chief complaint of abnormal lab results. Patient was seen by her PCP on 11/02/2018 for routine follow up, was called today for abnormal blood work showing decreased kidney function, and referred to [facility] ED for further evaluation. Patient has no new pain or discomfort, but notes feeling anxious and \"jittery\" since the call from her PCP. ecently had blood in stool described as \"bright red\" which she attributes to \"strain\" due to infrequent bowel movements, and she is in process of scheduling colonoscopy with GI. Reported using NSAIDs for ED. Creatinine went up from 0.9 to 7.52 [place] (MCG) criteria Does  NOT apply ARF  
STATUS DETERMINATION  INPATIENT The final decision of the patient's hospitalization status depends on the attending physician's judgment Additional comments Payor: [facility] [address] / Plan: [address] / Product Type: Managed Care Medicaid /   
  
 
Haley Jansen MD 
Cell: [phone] Physician Advisor

## 2018-11-07 NOTE — PROGRESS NOTES
Physical Therapy Screening: 
Services are not indicated at this time. An InBasket screening referral was triggered for physical therapy based on results obtained during the nursing admission assessment. The patients chart was reviewed and the patient is not appropriate for a skilled therapy evaluation at this time. Please consult physical therapy if any therapy needs arise. Thank you.  
 
Macario Hendrickson, PT

## 2018-11-07 NOTE — PROGRESS NOTES
Problem: Falls - Risk of 
Goal: *Absence of Falls Document Jaz Workman Fall Risk and appropriate interventions in the flowsheet. Outcome: Progressing Towards Goal 
Fall Risk Interventions: 
  
 
  
 
Medication Interventions: Evaluate medications/consider consulting pharmacy History of Falls Interventions: Door open when patient unattended

## 2018-11-07 NOTE — PROGRESS NOTES
Hospitalist Progress Note Dallin Freitas MD 
Answering service: [phone] OR [phone] from in house phone Cell: [phone] Date of Service:  2018 NAME:  Azeem King :  1955 MRN:  [id number] Admission Summary:  
Azeem King is a 66 y.o. female with PMH of Gout, Arthritis, Diabetes, Hypertension, and Asthma who presents via EMS with chief complaint of abnormal lab results. Patient was seen by her PCP on 2018 for routine follow up, was called today for abnormal blood work showing decreased kidney function, and referred to [facility] ED for further evaluation. Patient has no new pain or discomfort, but notes feeling anxious and \"jittery\" since the call from her PCP. ecently had blood in stool described as \"bright red\" which she attributes to \"strain\" due to infrequent bowel movements, and she is in process of scheduling colonoscopy with GI. Reported using NSAIDs for ED Interval history / Subjective: She said she feels better, she said she has been taking lipozon  tabs po bid for last 2 months for  weight loss, no diarrhea while she was using the lipozon No acute complaint, no chest pain or shortness of breath Assessment & Plan: ILA  
-on normal saline at 75 ml/hr 
-creatinine improving, cr 4.25 from 7.51 
-ESR, CRP, SPEP, UPEP, Free light chain pending 
-US of kidney no hydronephrosis or large shadowing calculus. -Nephrologist on board DM-II 
-on lantus 40 units, lispro 7 units, sliding scale and monitor finger stick glucose 
-A1c 5.3 Normocytic anemia  
-Hgb 8.2 
-iron profile low 
-stool for hem occult pending HTN 
-BP low normal, hold amlodipine, labetalol and monitor BP Hx of neuropathy  
-on gabapentin Hx of gout 
-continue allopurinol Hx of asthma 
-stable Code status: Full Code DVT prophylaxis: Heparin Care Plan discussed with: Patient/Family, Nurse and  Disposition: TBD Mrs Sawyer Cárdenas asked me to call her son, Tao Weir at [phone], it was a voice mail, will try later Hospital Problems  Date Reviewed: 5/25/2017 Codes Class Noted POA * (Principal) ILA (acute kidney injury) ([facility] ICD-10-CM: N17.9 ICD-9-CM: 584.9  11/5/2018 Yes Diabetes mellitus type 2, controlled ([facility] ICD-10-CM: E11.9 ICD-9-CM: 250.00  11/11/2016 Yes Asthma ICD-10-CM: H44.923 ICD-9-CM: 493.90  11/11/2016 Yes Gout ICD-10-CM: M10.9 ICD-9-CM: 274.9  11/11/2016 Yes Hypertension ICD-10-CM: I10 
ICD-9-CM: 401.9  11/11/2016 Yes Sleep apnea ICD-10-CM: G47.30 ICD-9-CM: 780.57  11/11/2016 Yes Arthritis ICD-10-CM: M19.90 ICD-9-CM: 716.90  11/11/2016 Yes Vital Signs:  
 Last 24hrs VS reviewed since prior progress note. Most recent are: 
Visit Vitals /66 (BP 1 Location: Left arm, BP Patient Position: At rest) Pulse 92 Temp 98.3 °F (36.8 °C) Resp 16 Ht 5' 5\" (1.651 m) Wt 132.6 kg (292 lb 5.3 oz) SpO2 97% BMI 48.65 kg/m² Intake/Output Summary (Last 24 hours) at 11/7/2018 1035 Last data filed at 11/7/2018 1059 Gross per 24 hour Intake 1470 ml Output 300 ml Net 1170 ml Physical Examination:  
 
 
     
Constitutional:  No acute distress, cooperative, pleasant   
ENT:  Oral mucous moist, oropharynx benign. Neck supple, Resp:  CTA bilaterally. No wheezing/rhonchi/rales. No accessory muscle use CV:  Regular rhythm, normal rate, no murmurs, gallops, rubs GI:  Soft, non distended, non tender. normoactive bowel sounds, no hepatosplenomegaly Musculoskeletal:  No edema Neurologic:  Moves all extremities. AAOx3, CN II-XII reviewed Skin:  Good turgor, no rashes or ulcers Data Review:  
 Review and/or order of clinical lab test 
 
 
Labs:  
 
Recent Labs 11/07/18 
0238 11/05/18 
0811 WBC 7.4 9.5 HGB 8.2* 9.7* HCT 26.7* 32.0*  
 297 Recent Labs 11/07/18 4382 11/06/18 
0518 11/05/18 
1873  139 136  
K 4.3 4.3 4.4  
 107 102 CO2 19* 17* 21 BUN 88* 97* 100* CREA 4.24* 6.14* 7.52* * 108* 76  
CA 8.7 8.1* 9.1 Recent Labs 11/07/18 
0238 11/05/18 
5638 SGOT 13* 15 ALT 19 20 AP 66 78 TBILI 0.3 0.5 TP 6.4 8.0 ALB 2.6* 3.4*  
GLOB 3.8 4.6* No results for input(s): INR, PTP, APTT in the last 72 hours. No lab exists for component: INREXT, INREXT Recent Labs 11/06/18 0518 TIBC 261 PSAT 8*  
FERR 124 Lab Results Component Value Date/Time Folate 64.0 (H) 11/06/2018 05:18 AM  
  
No results for input(s): PH, PCO2, PO2 in the last 72 hours. Recent Labs 11/05/18 
1971  No results found for: CHOL, CHOLX, CHLST, CHOLV, HDL, LDL, LDLC, DLDLP, TGLX, TRIGL, TRIGP, CHHD, CHHDX Lab Results Component Value Date/Time Glucose (POC) 119 (H) 11/07/2018 06:21 AM  
 Glucose (POC) 137 (H) 11/06/2018 09:22 PM  
 Glucose (POC) 134 (H) 11/06/2018 04:23 PM  
 Glucose (POC) 100 11/05/2018 09:48 PM  
 Glucose (POC) 90 11/05/2018 05:17 PM  
 
Lab Results Component Value Date/Time Color YELLOW/STRAW 11/05/2018 02:28 PM  
 Appearance CLOUDY (A) 11/05/2018 02:28 PM  
 Specific gravity 1.024 11/05/2018 02:28 PM  
 Specific gravity 1.025 02/26/2015 12:00 PM  
 pH (UA) 5.0 11/05/2018 02:28 PM  
 Protein NEGATIVE  11/05/2018 02:28 PM  
 Glucose NEGATIVE  11/05/2018 02:28 PM  
 Ketone NEGATIVE  11/05/2018 02:28 PM  
 Bilirubin NEGATIVE  02/26/2015 12:00 PM  
 Urobilinogen 1.0 11/05/2018 02:28 PM  
 Nitrites NEGATIVE  11/05/2018 02:28 PM  
 Leukocyte Esterase MODERATE (A) 11/05/2018 02:28 PM  
 Epithelial cells MANY (A) 11/05/2018 02:28 PM  
 Bacteria 1+ (A) 11/05/2018 02:28 PM  
 WBC 5-10 11/05/2018 02:28 PM  
 RBC 0-5 11/05/2018 02:28 PM  
 
 
 
Medications Reviewed:  
 
Current Facility-Administered Medications Medication Dose Route Frequency  sodium bicarbonate tablet 650 mg  650 mg Oral BID  
  0.9% sodium chloride infusion  75 mL/hr IntraVENous CONTINUOUS  
 sodium chloride (NS) flush 5-10 mL  5-10 mL IntraVENous Q8H  
 sodium chloride (NS) flush 5-10 mL  5-10 mL IntraVENous PRN  
 HYDROcodone-acetaminophen (NORCO) 5-325 mg per tablet 1 Tab  1 Tab Oral Q4H PRN  
 ondansetron (ZOFRAN) injection 4 mg  4 mg IntraVENous Q4H PRN  
 heparin (porcine) injection 5,000 Units  5,000 Units SubCUTAneous Q8H  
 allopurinol (ZYLOPRIM) tablet 300 mg  300 mg Oral DAILY  aspirin delayed-release tablet 81 mg  81 mg Oral DAILY  cyclobenzaprine (FLEXERIL) tablet 10 mg  10 mg Oral TID PRN  
 gabapentin (NEURONTIN) capsule 400 mg  400 mg Oral BID  pravastatin (PRAVACHOL) tablet 40 mg  40 mg Oral DAILY  traMADol (ULTRAM) tablet 100 mg  100 mg Oral TID  albuterol (PROVENTIL VENTOLIN) nebulizer solution 2.5 mg  2.5 mg Nebulization Q6H PRN  
 glucose chewable tablet 16 g  4 Tab Oral PRN  
 dextrose (D50W) injection syrg 12.5-25 g  25-50 mL IntraVENous PRN  
 glucagon (GLUCAGEN) injection 1 mg  1 mg IntraMUSCular PRN  
 insulin lispro (HUMALOG) injection   SubCUTAneous AC&HS  insulin glargine (LANTUS) injection 40 Units  0.3 Units/kg SubCUTAneous QHS  
 
______________________________________________________________________ EXPECTED LENGTH OF STAY: - - - 
ACTUAL LENGTH OF STAY:          0 Josi Garcia MD

## 2018-11-07 NOTE — PROGRESS NOTES
Bedside shift change report given to [facility] RN/Leatha MARIEE (oncoming nurse) by Kay Anne (offgoing nurse). Report included the following information SBAR, MAR, Recent Results and Med Rec Status.

## 2018-11-08 VITALS
RESPIRATION RATE: 18 BRPM | DIASTOLIC BLOOD PRESSURE: 95 MMHG | HEART RATE: 99 BPM | SYSTOLIC BLOOD PRESSURE: 138 MMHG | OXYGEN SATURATION: 99 % | HEIGHT: 65 IN | TEMPERATURE: 98.5 F | WEIGHT: 292.33 LBS | BODY MASS INDEX: 48.71 KG/M2

## 2018-11-08 LAB
ANION GAP SERPL CALC-SCNC: 9 MMOL/L (ref 5–15)
BUN SERPL-MCNC: 60 MG/DL (ref 6–20)
BUN/CREAT SERPL: 28 (ref 12–20)
CALCIUM SERPL-MCNC: 9 MG/DL (ref 8.5–10.1)
CHLORIDE SERPL-SCNC: 109 MMOL/L (ref 97–108)
CO2 SERPL-SCNC: 23 MMOL/L (ref 21–32)
CREAT SERPL-MCNC: 2.17 MG/DL (ref 0.55–1.02)
GLUCOSE BLD STRIP.AUTO-MCNC: 85 MG/DL (ref 65–100)
GLUCOSE SERPL-MCNC: 97 MG/DL (ref 65–100)
POTASSIUM SERPL-SCNC: 4.1 MMOL/L (ref 3.5–5.1)
SERVICE CMNT-IMP: NORMAL
SODIUM SERPL-SCNC: 141 MMOL/L (ref 136–145)

## 2018-11-08 PROCEDURE — 74011250636 HC RX REV CODE- 250/636: Performed by: INTERNAL MEDICINE

## 2018-11-08 PROCEDURE — 74011250637 HC RX REV CODE- 250/637: Performed by: HOSPITALIST

## 2018-11-08 PROCEDURE — 74011250637 HC RX REV CODE- 250/637: Performed by: INTERNAL MEDICINE

## 2018-11-08 PROCEDURE — 36415 COLL VENOUS BLD VENIPUNCTURE: CPT

## 2018-11-08 PROCEDURE — 80048 BASIC METABOLIC PNL TOTAL CA: CPT

## 2018-11-08 PROCEDURE — 82962 GLUCOSE BLOOD TEST: CPT

## 2018-11-08 RX ORDER — AMLODIPINE BESYLATE 2.5 MG/1
2.5 TABLET ORAL DAILY
Qty: 30 TAB | Refills: 0 | Status: SHIPPED | OUTPATIENT
Start: 2018-11-09 | End: 2021-05-17

## 2018-11-08 RX ORDER — LIDOCAINE 50 MG/G
PATCH TOPICAL
Qty: 5 EACH | Refills: 0 | Status: SHIPPED | OUTPATIENT
Start: 2018-11-08 | End: 2021-05-17

## 2018-11-08 RX ORDER — AMLODIPINE BESYLATE 5 MG/1
2.5 TABLET ORAL DAILY
Status: DISCONTINUED | OUTPATIENT
Start: 2018-11-08 | End: 2018-11-08 | Stop reason: HOSPADM

## 2018-11-08 RX ADMIN — TRAMADOL HYDROCHLORIDE 100 MG: 50 TABLET, FILM COATED ORAL at 08:37

## 2018-11-08 RX ADMIN — PRAVASTATIN SODIUM 40 MG: 40 TABLET ORAL at 08:37

## 2018-11-08 RX ADMIN — Medication 10 ML: at 06:56

## 2018-11-08 RX ADMIN — AMLODIPINE BESYLATE 2.5 MG: 5 TABLET ORAL at 08:38

## 2018-11-08 RX ADMIN — ASPIRIN 81 MG: 81 TABLET, COATED ORAL at 08:37

## 2018-11-08 RX ADMIN — ALLOPURINOL 300 MG: 300 TABLET ORAL at 08:37

## 2018-11-08 RX ADMIN — HYDROCODONE BITARTRATE AND ACETAMINOPHEN 1 TABLET: 5; 325 TABLET ORAL at 02:57

## 2018-11-08 RX ADMIN — HYDROCODONE BITARTRATE AND ACETAMINOPHEN 1 TABLET: 5; 325 TABLET ORAL at 06:56

## 2018-11-08 RX ADMIN — GABAPENTIN 400 MG: 100 CAPSULE ORAL at 06:56

## 2018-11-08 RX ADMIN — SODIUM BICARBONATE 650 MG: 650 TABLET ORAL at 08:37

## 2018-11-08 RX ADMIN — HEPARIN SODIUM 5000 UNITS: 5000 INJECTION INTRAVENOUS; SUBCUTANEOUS at 06:56

## 2018-11-08 NOTE — PROGRESS NOTES
Hospital follow-up PCP transitional care appointment has been scheduled with Dr. Alexandr Miranda for Tuesday, 11/13/18 at 10:30 a.m. Pending patient discharge.   Foster Rodriguez, Care Management Specialist.

## 2018-11-08 NOTE — PROGRESS NOTES
RENAL  PROGRESS NOTE Subjective:  
Doing well Objective: VITALS SIGNS:   
Visit Vitals BP (!) 138/95 (BP 1 Location: Right arm, BP Patient Position: At rest) Pulse 99 Temp 98.5 °F (36.9 °C) Resp 18 Ht 5' 5\" (1.651 m) Wt 132.6 kg (292 lb 5.3 oz) LMP 2006 SpO2 99% BMI 48.65 kg/m² O2 Device: Room air Temp (24hrs), Av.4 °F (36.9 °C), Min:98.1 °F (36.7 °C), Max:98.7 °F (37.1 °C) PHYSICAL EXAM: 
No edema NAD 
 
DATA REVIEW:  
 
INTAKE / OUTPUT:  
Last shift:      No intake/output data recorded. Last 3 shifts:  1901 -  0700 In: 1877 [P.O.:800; I.V.:1077] Out: 750 [Urine:750] Intake/Output Summary (Last 24 hours) at 2018 0211 Last data filed at 2018 1813 Gross per 24 hour Intake 1877 ml Output 750 ml Net 1127 ml LABS:  
Recent Labs 18 
0238 18 
0042 WBC 7.4 9.5 HGB 8.2* 9.7* HCT 26.7* 32.0*  
 297 Recent Labs 18 
3751 18 
7776 18 
0518 18 
0634  137 139 136  
K 4.1 4.3 4.3 4.4  
* 106 107 102 CO2 23 19* 17* 21  
GLU 97 117* 108* 76 BUN 60* 88* 97* 100* CREA 2.17* 4.24* 6.14* 7.52* CA 9.0 8.7 8.1* 9.1 ALB  --  2.6*  --  3.4* TBILI  --  0.3  --  0.5 SGOT  --  13*  --  15 ALT  --  19  --  20 Assessment:  
 
ILA,in the setting of lower BP,ARB,NSAID ; expect creat to improve 'much better' if only pre-renal;worry about ischemic ATN,AIN Met acidosis Heavy NSAID use DM Cardiac cath on  Plan:  
 dc NSAID Can dc home from renal standpoint Dc dietary suppl Follow up next week in renal office Discussed with her Abeba Martin MD

## 2018-11-08 NOTE — DISCHARGE SUMMARY
Discharge Summary       PATIENT ID: David Thakur  MRN: [id number]   YOB: 1955    DATE OF ADMISSION: 11/5/2018  9:38 AM    DATE OF DISCHARGE: 11/8/2018   PRIMARY CARE PROVIDER: Ro Kearney MD     ATTENDING PHYSICIAN: Sommer Warren MD  DISCHARGING PROVIDER: Philipp De Leon MD    To contact this individual call [phone] and ask the  to page. If unavailable ask to be transferred the Adult Hospitalist Department. CONSULTATIONS: IP CONSULT TO PRIMARY CARE PROVIDER  IP CONSULT TO NEPHROLOGY  IP CONSULT TO HOSPITALIST    PROCEDURES/SURGERIES: * No surgery found *    ADMITTING [facility] COURSE:     Iris Griffin a 66 y.o. female with PMH of Gout, Arthritis, Diabetes, Hypertension, and Asthma who presents via EMS with chief complaint of abnormal lab results. Patient was seen by her PCP on 11/02/2018 for routine follow up, was called today for abnormal blood work showing decreased kidney function, and referred to [facility] for further evaluation. Patient has no new pain or discomfort, but notes feeling anxious and \"jittery\" since the call from her PCP. ecently had blood in stool described as \"bright red\" which she attributes to \"strain\" due to infrequent bowel movements, and she is in process of scheduling colonoscopy with GI. Reported using NSAIDs for ED  ILA   -on normal saline at 75 ml/hr  -creatinine improving from 7.51 to 2.17 on 11/8  - SPEP and UPEP pending, Free light chain elevated kappa and lambda levle  -US of kidney no hydronephrosis or large shadowing calculus.   -Nephrologist on board  DM-II  -on lantus 40 units, lispro 7 units, sliding scale and monitor finger stick glucose  -A1c 5.3  Normocytic anemia   -Hgb 8.2  -iron profile low  HTN  -BP low normal, hold amlodipine, labetalol and monitor BP  Hx of neuropathy   -stable, continue on gabapentin  Hx of gout  -continue allopurinol  Hx of asthma  -stable     Code status: Full Code  DVT prophylaxis: Heparin    DISCHARGE DIAGNOSES / PLAN:      ILA   -improving with normal saline  -outpatient follow up with Nephrologist  DM-II  -continue home insuline detemir, victoza, sliding scale and monitor finger stick glucose  -A1c 5.3  -metformin after creatinine improved  Normocytic anemia   -stable  HTN  -continue amlodipine 2.5 mg   -hold, labetalol and monitor BP  -follow up with PCP  Hx of neuropathy   - continue on gabapentin  Hx of gout  -continue allopurinol  Hx of asthma  -stable    PENDING TEST RESULTS:   At the time of discharge the following test results are still pending: none     FOLLOW UP APPOINTMENTS:    Follow-up Information     Follow up With Specialties Details Why Contact Info   1. Chucho Jones MD Internal Medicine In one week  [address]    [address] [place] [address]  [phone]       2. Jada Myers MD, Nephrologist next week    ADDITIONAL CARE RECOMMENDATIONS:     DIET: Diabetic Diet    ACTIVITY: Activity as tolerated    WOUND CARE: None    EQUIPMENT needed: None      DISCHARGE MEDICATIONS:  Current Discharge Medication List      CONTINUE these medications which have CHANGED    Details   amLODIPine (NORVASC) 2.5 mg tablet Take 1 Tab by mouth daily. Qty: 30 Tab, Refills: 0         CONTINUE these medications which have NOT CHANGED    Details   prenatal no122/iron/folic acid (PRENATAL MULTI PO) Take 1 Cap by mouth daily. Prenatal, multivitamin w/folic acid 1 mg      traMADol (ULTRAM) 50 mg tablet Take 100 mg by mouth three (3) times daily. Takes in morning, evening, and at night      acetaminophen (MAPAP) 500 mg capsule Take 1,000 mg by mouth two (2) times a day. insulin detemir U-100 (LEVEMIR U-100 INSULIN) 100 unit/mL injection 48 Units by SubCUTAneous route every evening. Liraglutide (VICTOZA) 0.6 mg/0.1 mL (18 mg/3 mL) pnij by SubCUTAneous route daily.  Dosing varies based on blood glucose level      cholecalciferol (VITAMIN D3) 1,000 unit cap Take 3,000 Units by mouth daily. aspirin delayed-release 81 mg tablet Take 81 mg by mouth daily. OMEGA-3 FATTY ACIDS/FISH OIL (OMEGA 3 FISH OIL PO) Take 2 Tabs by mouth daily. cyclobenzaprine (FLEXERIL) 10 mg tablet Take 10 mg by mouth three (3) times daily as needed for Muscle Spasm(s). VENTOLIN HFA 90 mcg/actuation inhaler Take 2 Puffs by inhalation every six (6) hours as needed for Wheezing. Refills: 1      allopurinol (ZYLOPRIM) 300 mg tablet Take 300 mg by mouth daily. Refills: 0      gabapentin (NEURONTIN) 400 mg capsule 400 mg two (2) times a day. Refills: 0      pravastatin (PRAVACHOL) 40 mg tablet Take 40 mg by mouth daily. Refills: 1         STOP taking these medications       labetalol (NORMODYNE) 100 mg tablet Comments:   Reason for Stopping:         valsartan-hydroCHLOROthiazide (DIOVAN-HCT) 320-25 mg per tablet Comments:   Reason for Stopping:         diclofenac (VOLTAREN) 1 % gel Comments:   Reason for Stopping:         metFORMIN (GLUMETZA ER) 500 mg TG24 24 hour tablet Comments:   Reason for Stopping:         diclofenac EC (VOLTAREN) 75 mg EC tablet Comments:   Reason for Stopping:         metFORMIN (GLUCOPHAGE) 500 mg tablet Comments:   Reason for Stopping:         PNV/IRON,CARBONYL/DOCUSATE/FA (PRENA-CAP PO) Comments:   Reason for Stopping:         diclofenac EC (VOLTAREN) 75 mg EC tablet Comments:   Reason for Stopping:         PRENATAL PLUS, CALCIUM CARB, 27 mg iron- 1 mg tab Comments:   Reason for Stopping:         metFORMIN (GLUCOPHAGE) 500 mg tablet Comments:   Reason for Stopping:                 NOTIFY YOUR PHYSICIAN FOR ANY OF THE FOLLOWING:   Fever over 101 degrees for 24 hours. Chest pain, shortness of breath, fever, chills, nausea, vomiting, diarrhea, change in mentation, falling, weakness, bleeding. Severe pain or pain not relieved by medications. Or, any other signs or symptoms that you may have questions about.     DISPOSITION:    Home With:   OT  PT  Atif Lujan RN Long term SNF/Inpatient Rehab   x Independent/assisted living    Hospice    Other:       PATIENT CONDITION AT DISCHARGE:     Functional status    Poor     Deconditioned    x Independent      Cognition   x  Lucid     Forgetful     Dementia      Catheters/lines (plus indication)    [place]     PICC     PEG    x None      Code status   x  Full code     DNR      PHYSICAL EXAMINATION AT DISCHARGE:   Refer to Progress Note      CHRONIC MEDICAL DIAGNOSES:  Problem List as of 11/8/2018 Date Reviewed: 5/25/2017          Codes Class Noted - Resolved    * (Principal) ILA (acute kidney injury) ([facility] ICD-10-CM: N17.9  ICD-9-CM: 584.9  11/5/2018 - Present        Diabetes mellitus type 2, controlled ([facility] ICD-10-CM: E11.9  ICD-9-CM: 250.00  11/11/2016 - Present        Asthma ICD-10-CM: J45.909  ICD-9-CM: 493.90  11/11/2016 - Present        Gout ICD-10-CM: M10.9  ICD-9-CM: 274.9  11/11/2016 - Present        Hypertension ICD-10-CM: I10  ICD-9-CM: 401.9  11/11/2016 - Present        Sleep apnea ICD-10-CM: G47.30  ICD-9-CM: 780.57  11/11/2016 - Present        Arthritis ICD-10-CM: M19.90  ICD-9-CM: 716.90  11/11/2016 - Present              Greater than 40 minutes were spent with the patient on counseling and coordination of care    Signed:   Maryse Lindsey MD  11/8/2018  9:01 AM

## 2018-11-08 NOTE — PROGRESS NOTES
Patient discharge today. CM arranged  Cab transport with [employer]. Cab transport arranged for 11am.  CM spoke with patient to advise of transport time. CM provided assigned nurse Lena Wallace RN) with pickup- time.   AMBAR MorochoW, CRM

## 2018-11-08 NOTE — PROGRESS NOTES
Bedside shift change report given to [address] (oncoming nurse) by Stefanie Ha (offgoing nurse). Report included the following information SBAR, MAR and Recent Results.

## 2018-11-08 NOTE — DISCHARGE INSTRUCTIONS
Discharge Instructions       PATIENT ID: Michael Patel  MRN: [id number]   YOB: 1955    DATE OF ADMISSION: 11/5/2018  9:38 AM    DATE OF DISCHARGE: 11/8/2018    PRIMARY CARE PROVIDER: Renetta Smith MD     ATTENDING PHYSICIAN: Tarun Swanson MD  DISCHARGING PROVIDER: Hailey Huntley MD    To contact this individual call [phone] and ask the  to page. If unavailable ask to be transferred the Adult Hospitalist Department. DISCHARGE DIAGNOSES   ILA   DM-II  Normocytic anemia   HTN  Hx of neuropathy   Hx of gout  Hx of asthma      CONSULTATIONS: IP CONSULT TO PRIMARY CARE PROVIDER  IP CONSULT TO NEPHROLOGY  IP CONSULT TO HOSPITALIST    PROCEDURES/SURGERIES: * No surgery found *    PENDING TEST RESULTS:   At the time of discharge the following test results are still pending: none    FOLLOW UP APPOINTMENTS:   Follow-up Information     Follow up With Specialties Details Why Contact Info   1. Renetta Smith MD Internal Medicine In one week  [address]    [address] [place] [address]  [phone]       2. Kiana Torres MD, Nephrologist next week      ADDITIONAL CARE RECOMMENDATIONS:     DIET: Diabetic Diet    ACTIVITY: Activity as tolerated    WOUND CARE: none    EQUIPMENT needed: none      DISCHARGE MEDICATIONS:   See Medication Reconciliation Form    · It is important that you take the medication exactly as they are prescribed. · Keep your medication in the bottles provided by the pharmacist and keep a list of the medication names, dosages, and times to be taken in your wallet. · Do not take other medications without consulting your doctor. NOTIFY YOUR PHYSICIAN FOR ANY OF THE FOLLOWING:   Fever over 101 degrees for 24 hours. Chest pain, shortness of breath, fever, chills, nausea, vomiting, diarrhea, change in mentation, falling, weakness, bleeding. Severe pain or pain not relieved by medications.   Or, any other signs or symptoms that you may have questions about.       DISPOSITION:    Home With:   OT  PT  HH  RN       SNF/Inpatient Rehab/LTAC   x Independent/assisted living    Hospice    Other:     CDMP Checked:   Yes x     PROBLEM LIST Updated:  Yes x       Signed:   Dallin Perez MD  11/8/2018  8:58 AM

## 2019-03-19 ENCOUNTER — HOSPITAL ENCOUNTER (EMERGENCY)
Age: 64
Discharge: HOME | End: 2019-03-19
Attending: EMERGENCY MEDICINE
Payer: COMMERCIAL

## 2019-03-19 VITALS
BODY MASS INDEX: 48.7 KG/M2 | DIASTOLIC BLOOD PRESSURE: 92 MMHG | RESPIRATION RATE: 16 BRPM | SYSTOLIC BLOOD PRESSURE: 153 MMHG | TEMPERATURE: 99 F | HEART RATE: 98 BPM | WEIGHT: 292.3 LBS | HEIGHT: 65 IN | OXYGEN SATURATION: 96 %

## 2019-03-19 DIAGNOSIS — R73.9 HYPERGLYCEMIA: Primary | ICD-10-CM

## 2019-03-19 LAB
ALBUMIN SERPL-MCNC: 3.3 G/DL (ref 3.5–5)
ALBUMIN/GLOB SERPL: 0.8 {RATIO} (ref 1.1–2.2)
ALP SERPL-CCNC: 109 U/L (ref 45–117)
ALT SERPL-CCNC: 25 U/L (ref 12–78)
ANION GAP SERPL CALC-SCNC: 10 MMOL/L (ref 5–15)
AST SERPL-CCNC: 26 U/L (ref 15–37)
BASOPHILS # BLD: 0.1 K/UL (ref 0–0.1)
BASOPHILS NFR BLD: 1 % (ref 0–1)
BILIRUB SERPL-MCNC: 0.5 MG/DL (ref 0.2–1)
BUN SERPL-MCNC: 40 MG/DL (ref 6–20)
BUN/CREAT SERPL: 27 (ref 12–20)
CALCIUM SERPL-MCNC: 9.4 MG/DL (ref 8.5–10.1)
CHLORIDE SERPL-SCNC: 83 MMOL/L (ref 97–108)
CO2 SERPL-SCNC: 35 MMOL/L (ref 21–32)
COMMENT, HOLDF: NORMAL
CREAT SERPL-MCNC: 1.46 MG/DL (ref 0.55–1.02)
DIFFERENTIAL METHOD BLD: ABNORMAL
EOSINOPHIL # BLD: 0.2 K/UL (ref 0–0.4)
EOSINOPHIL NFR BLD: 3 % (ref 0–7)
ERYTHROCYTE [DISTWIDTH] IN BLOOD BY AUTOMATED COUNT: 14.7 % (ref 11.5–14.5)
GLOBULIN SER CALC-MCNC: 4.3 G/DL (ref 2–4)
GLUCOSE BLD STRIP.AUTO-MCNC: 367 MG/DL (ref 65–100)
GLUCOSE BLD STRIP.AUTO-MCNC: 456 MG/DL (ref 65–100)
GLUCOSE SERPL-MCNC: 437 MG/DL (ref 65–100)
HCT VFR BLD AUTO: 39.6 % (ref 35–47)
HGB BLD-MCNC: 12.4 G/DL (ref 11.5–16)
IMM GRANULOCYTES # BLD AUTO: 0 K/UL (ref 0–0.04)
IMM GRANULOCYTES NFR BLD AUTO: 0 % (ref 0–0.5)
LYMPHOCYTES # BLD: 2.3 K/UL (ref 0.8–3.5)
LYMPHOCYTES NFR BLD: 34 % (ref 12–49)
MAGNESIUM SERPL-MCNC: 2.1 MG/DL (ref 1.6–2.4)
MCH RBC QN AUTO: 27.3 PG (ref 26–34)
MCHC RBC AUTO-ENTMCNC: 31.3 G/DL (ref 30–36.5)
MCV RBC AUTO: 87 FL (ref 80–99)
MONOCYTES # BLD: 0.6 K/UL (ref 0–1)
MONOCYTES NFR BLD: 9 % (ref 5–13)
NEUTS SEG # BLD: 3.7 K/UL (ref 1.8–8)
NEUTS SEG NFR BLD: 53 % (ref 32–75)
NRBC # BLD: 0 K/UL (ref 0–0.01)
NRBC BLD-RTO: 0 PER 100 WBC
PLATELET # BLD AUTO: 273 K/UL (ref 150–400)
PMV BLD AUTO: 11.3 FL (ref 8.9–12.9)
POTASSIUM SERPL-SCNC: 2.3 MMOL/L (ref 3.5–5.1)
PROT SERPL-MCNC: 7.6 G/DL (ref 6.4–8.2)
RBC # BLD AUTO: 4.55 M/UL (ref 3.8–5.2)
SAMPLES BEING HELD,HOLD: NORMAL
SERVICE CMNT-IMP: ABNORMAL
SERVICE CMNT-IMP: ABNORMAL
SODIUM SERPL-SCNC: 128 MMOL/L (ref 136–145)
WBC # BLD AUTO: 7 K/UL (ref 3.6–11)

## 2019-03-19 PROCEDURE — 85025 COMPLETE CBC W/AUTO DIFF WBC: CPT

## 2019-03-19 PROCEDURE — 74011250636 HC RX REV CODE- 250/636: Performed by: EMERGENCY MEDICINE

## 2019-03-19 PROCEDURE — 74011250637 HC RX REV CODE- 250/637: Performed by: EMERGENCY MEDICINE

## 2019-03-19 PROCEDURE — 83735 ASSAY OF MAGNESIUM: CPT

## 2019-03-19 PROCEDURE — 74011636637 HC RX REV CODE- 636/637: Performed by: EMERGENCY MEDICINE

## 2019-03-19 PROCEDURE — 36415 COLL VENOUS BLD VENIPUNCTURE: CPT

## 2019-03-19 PROCEDURE — 96374 THER/PROPH/DIAG INJ IV PUSH: CPT

## 2019-03-19 PROCEDURE — 82962 GLUCOSE BLOOD TEST: CPT

## 2019-03-19 PROCEDURE — 96361 HYDRATE IV INFUSION ADD-ON: CPT

## 2019-03-19 PROCEDURE — 80053 COMPREHEN METABOLIC PANEL: CPT

## 2019-03-19 PROCEDURE — 99284 EMERGENCY DEPT VISIT MOD MDM: CPT

## 2019-03-19 RX ORDER — HYDROCHLOROTHIAZIDE 12.5 MG/1
12.5 CAPSULE ORAL DAILY
COMMUNITY
End: 2021-05-17

## 2019-03-19 RX ORDER — POTASSIUM CHLORIDE 750 MG/1
40 TABLET, FILM COATED, EXTENDED RELEASE ORAL
Status: COMPLETED | OUTPATIENT
Start: 2019-03-19 | End: 2019-03-19

## 2019-03-19 RX ORDER — CHLORTHALIDONE 25 MG/1
TABLET ORAL DAILY
COMMUNITY
End: 2021-05-17

## 2019-03-19 RX ORDER — LABETALOL 100 MG/1
TABLET, FILM COATED ORAL 2 TIMES DAILY
COMMUNITY
End: 2021-05-17

## 2019-03-19 RX ORDER — ATORVASTATIN CALCIUM 40 MG/1
40 TABLET, FILM COATED ORAL
COMMUNITY

## 2019-03-19 RX ORDER — COLCHICINE 0.6 MG/1
0.6 TABLET ORAL 2 TIMES DAILY
COMMUNITY

## 2019-03-19 RX ORDER — ALBUTEROL SULFATE 90 UG/1
2 AEROSOL, METERED RESPIRATORY (INHALATION)
COMMUNITY

## 2019-03-19 RX ADMIN — POTASSIUM CHLORIDE 40 MEQ: 750 TABLET, EXTENDED RELEASE ORAL at 14:24

## 2019-03-19 RX ADMIN — SODIUM CHLORIDE 1000 ML: 900 INJECTION, SOLUTION INTRAVENOUS at 13:15

## 2019-03-19 RX ADMIN — HUMAN INSULIN 10 UNITS: 100 INJECTION, SOLUTION SUBCUTANEOUS at 13:15

## 2019-03-19 NOTE — DISCHARGE INSTRUCTIONS
Patient Education      Note: stick  To your diabetic diet; take your Victoza when you get home;see Dr Miryam Mason tomorrow or Thursday  Learning About High Blood Sugar  What is high blood sugar? Your body turns the food you eat into glucose (sugar), which it uses for energy. But if your body isn't able to use the sugar right away, it can build up in your blood and lead to high blood sugar. When the amount of sugar in your blood stays too high for too much of the time, you may have diabetes. Diabetes is a disease that can cause serious health problems. The good news is that lifestyle changes may help you get your blood sugar back to normal and avoid or delay diabetes. What causes high blood sugar? Sugar (glucose) can build up in your blood if you:  · Are overweight. · Have a family history of diabetes. · Take certain medicines, such as steroids. What are the symptoms? Having high blood sugar may not cause any symptoms at all. Or it may make you feel very thirsty or very hungry. You may also urinate more often than usual, have blurry vision, or lose weight without trying. How is high blood sugar treated? You can take steps to lower your blood sugar level if you understand what makes it get higher. Your doctor may want you to learn how to test your blood sugar level at home. Then you can see how illness, stress, or different kinds of food or medicine raise or lower your blood sugar level. Other tests may be needed to see if you have diabetes. How can you prevent high blood sugar? · Watch your weight. If you're overweight, losing just a small amount of weight may help. Reducing fat around your waist is most important. · Limit the amount of calories, sweets, and unhealthy fat you eat. Ask your doctor if a dietitian can help you. A registered dietitian can help you create meal plans that fit your lifestyle. · Get at least 30 minutes of exercise on most days of the week.  Exercise helps control your blood sugar. It also helps you maintain a healthy weight. Walking is a good choice. You also may want to do other activities, such as running, swimming, cycling, or playing tennis or team sports. · If your doctor prescribed medicines, take them exactly as prescribed. Call your doctor if you think you are having a problem with your medicine. You will get more details on the specific medicines your doctor prescribes. Follow-up care is a key part of your treatment and safety. Be sure to make and go to all appointments, and call your doctor if you are having problems. It's also a good idea to know your test results and keep a list of the medicines you take. Where can you learn more? Go to http://hayde-chelsea.info/. Enter O108 in the search box to learn more about \"Learning About High Blood Sugar. \"  Current as of: July 25, 2018  Content Version: 11.9  © 5901-4099 [employer]. Care instructions adapted under license by [employer] (which disclaims liability or warranty for this information). If you have questions about a medical condition or this instruction, always ask your healthcare professional. [address] any warranty or liability for your use of this information.

## 2019-03-19 NOTE — ED TRIAGE NOTES
Pt arrives via squad for high blood sugar, squad stated it was greater than 600, +nausea, denies fever, denies vomiting, denies diarrhea , BGL was 400 yesterday, pt stated she did not take her insulin this am. Patient reports Rafiq Palomo has been having a slight seizure where her eye gets to twitching and stuff. \"

## 2019-03-19 NOTE — ED NOTES
Patient given discharge instructions. No questions or concerns at this time. Patients VSS and in no acute distress. Patient ambulatory out of unit at discharge. Case management to meet with patient in waiting room to set up transportation at discharge.

## 2019-03-19 NOTE — ED PROVIDER NOTES
HPI History of present illness: Patient reports that her glucose readings have been high at home. She admits to polyuria and polydipsia. She says that a lot of her medications have been stopped due to renal insufficiency. She is no longer on metformin. She reports compliance with her long-acting insulin 50 units each night and her Victoza. She did miss her Victoza today. She reports about 3 days of elevated glucose. She says she has no idea why her glucoses are now running high. She thinks her glucose was 400 earlier today and thinks EMS glucose was 600. She says she is having \"slight seizures\" which she describes as her eyes blinking and twitching. Patient has not contacted her primary care doctor this week despite the elevated glucose. Past Medical History:  
Diagnosis Date  Arthritis  Asthma  Cardiac murmur  Diabetes ([facility]  Fibroid, uterine  Hypertension  Neurological disorder   
 neuropathy  Other ill-defined conditions(799.89)   
 gout  Sleep disorder Past Surgical History:  
Procedure Laterality Date  HX ORTHOPAEDIC    
 left shoulder - rotater cuff  HX ORTHOPAEDIC    
 HX TONSILLECTOMY [address]  LAP,TUBAL CAUTERY Family History:  
Problem Relation Age of Onset  Cancer Maternal Grandfather  Alcohol abuse Mother  Asthma Maternal Grandmother  Diabetes Paternal Grandmother Social History Socioeconomic History  Marital status: SINGLE Spouse name: Not on file  Number of children: Not on file  Years of education: Not on file  Highest education level: Not on file Social Needs  Financial resource strain: Not on file  Food insecurity - worry: Not on file  Food insecurity - inability: Not on file  Transportation needs - medical: Not on file  Transportation needs - non-medical: Not on file Occupational History  Not on file Tobacco Use  Smoking status: Never Smoker  Smokeless tobacco: Never Used Substance and Sexual Activity  Alcohol use: No  
  Comment: occassionaly  Drug use: No  
 Sexual activity: No  
Other Topics Concern  Not on file Social History Narrative ** Merged History Encounter ** ALLERGIES: Patient has no known allergies. Review of Systems All other systems reviewed and are negative. Vitals:  
 03/19/19 1124 BP: (!) 153/92 Pulse: 98 Resp: 16 Temp: 99 °F (37.2 °C) SpO2: 96% Weight: 132.6 kg (292 lb 4.8 oz) Height: 5' 5\" (1.651 m) Physical Exam  
Constitutional:  
obese HENT:  
Head: Normocephalic and atraumatic. Mouth/Throat: Oropharynx is clear and moist.  
Eyes: EOM are normal. Pupils are equal, round, and reactive to light. Neck: Normal range of motion. Neck supple. Cardiovascular: Normal rate, regular rhythm, normal heart sounds and intact distal pulses. Exam reveals no gallop and no friction rub. No murmur heard. Pulmonary/Chest: Effort normal. No respiratory distress. She has no wheezes. She has no rales. Abdominal: Soft. There is no tenderness. There is no rebound. Musculoskeletal: Normal range of motion. She exhibits no tenderness. cane Neurological: She is alert. No cranial nerve deficit. Motor; symmetric Skin: No erythema. Psychiatric: She has a normal mood and affect. Her behavior is normal.  
Nursing note and vitals reviewed. MDM Procedures

## 2019-03-19 NOTE — PROGRESS NOTES
CM notified of consult for transportation assistance. CM met with pt to verify address; address confirmed. CM contacted Medicaid to set-up transportation for 1615. Confirmation number: [id number]. Rodney Marquez RN, BSN Care Management Department [phone]: RN notified CM for update on transportation. CM contacted Medicaid--accepting transport company is [employer] and ETA 30 min. 1850: CM called to confirm pt pickup for discharge. Pt still in waiting room. CM set [facility] [employer] [facility]

## 2019-03-19 NOTE — ED NOTES
RN spoke with Case Management regarding ride for patient at discharge. Case Management to call [employer] for patient d/t prolonged wait for other transportation.

## 2019-03-19 NOTE — ED NOTES
RN called Case Management to check status of patient transportation out of ER. Case Management confirmed that transport would arrive around 1830.

## 2020-08-31 ENCOUNTER — HOSPITAL ENCOUNTER (EMERGENCY)
Age: 65
Discharge: HOME | End: 2020-08-31
Attending: EMERGENCY MEDICINE
Payer: MEDICAID

## 2020-08-31 VITALS
SYSTOLIC BLOOD PRESSURE: 155 MMHG | TEMPERATURE: 99 F | RESPIRATION RATE: 16 BRPM | BODY MASS INDEX: 48.82 KG/M2 | WEIGHT: 293 LBS | HEIGHT: 65 IN | DIASTOLIC BLOOD PRESSURE: 65 MMHG | OXYGEN SATURATION: 100 % | HEART RATE: 100 BPM

## 2020-08-31 DIAGNOSIS — S46.812A STRAIN OF LEFT TRAPEZIUS MUSCLE, INITIAL ENCOUNTER: ICD-10-CM

## 2020-08-31 DIAGNOSIS — S46.212A STRAIN OF LEFT BICEPS, INITIAL ENCOUNTER: Primary | ICD-10-CM

## 2020-08-31 PROCEDURE — 99283 EMERGENCY DEPT VISIT LOW MDM: CPT

## 2020-08-31 PROCEDURE — 74011250637 HC RX REV CODE- 250/637: Performed by: EMERGENCY MEDICINE

## 2020-08-31 RX ORDER — METHOCARBAMOL 500 MG/1
500 TABLET, FILM COATED ORAL 4 TIMES DAILY
Qty: 20 TAB | Refills: 0 | Status: SHIPPED | OUTPATIENT
Start: 2020-08-31 | End: 2021-05-17

## 2020-08-31 RX ORDER — NAPROXEN 500 MG/1
500 TABLET ORAL
Qty: 20 TAB | Refills: 0 | Status: SHIPPED | OUTPATIENT
Start: 2020-08-31 | End: 2021-05-17

## 2020-08-31 RX ORDER — NAPROXEN 250 MG/1
500 TABLET ORAL
Status: COMPLETED | OUTPATIENT
Start: 2020-08-31 | End: 2020-08-31

## 2020-08-31 RX ORDER — METHOCARBAMOL 500 MG/1
750 TABLET, FILM COATED ORAL
Status: COMPLETED | OUTPATIENT
Start: 2020-08-31 | End: 2020-08-31

## 2020-08-31 RX ADMIN — METHOCARBAMOL 750 MG: 500 TABLET ORAL at 15:57

## 2020-08-31 RX ADMIN — NAPROXEN 500 MG: 250 TABLET ORAL at 15:57

## 2020-08-31 NOTE — ED PROVIDER NOTES
EMERGENCY DEPARTMENT HISTORY AND PHYSICAL EXAM      Date: 8/31/2020  Patient Name: Keyana Jerry  Patient Age and Sex: 63 y.o. female     History of Presenting Illness     Chief Complaint   Patient presents with    Shoulder Pain       History Provided By: Patient    HPI: Keyana Jerry is a 82-year-old female with a history of arthritis presenting with left arm pain. Patient states that earlier this morning she was showering when she was lifting her arm and developed pain in her left upper arm as well as her shoulder. Patient states that the pain is worse with movement. States that she does have Tylenol and Flexeril at home but did not take that. Instead applied some diclofenac gel all over her arm with minimal improvement. There are no other complaints, changes, or physical findings at this time. PCP: Nithya Magana MD    No current facility-administered medications on file prior to encounter. Current Outpatient Medications on File Prior to Encounter   Medication Sig Dispense Refill    albuterol (PROVENTIL HFA, VENTOLIN HFA, PROAIR HFA) 90 mcg/actuation inhaler Take 2 Puffs by inhalation four (4) times daily as needed for Wheezing.  atorvastatin (LIPITOR) 40 mg tablet Take 40 mg by mouth daily.  chlorthalidone (HYGROTEN) 25 mg tablet Take  by mouth daily.  colchicine 0.6 mg tablet Take 0.6 mg by mouth daily.  hydroCHLOROthiazide (MICROZIDE) 12.5 mg capsule Take 12.5 mg by mouth daily.  labetalol (NORMODYNE) 100 mg tablet Take  by mouth two (2) times a day.  amLODIPine (NORVASC) 2.5 mg tablet Take 1 Tab by mouth daily. 30 Tab 0    lidocaine (LIDODERM) 5 % Apply patch to the affected area for 12 hours a day and remove for 12 hours a day. 5 Each 0    prenatal no122/iron/folic acid (PRENATAL MULTI PO) Take 1 Cap by mouth daily. Prenatal, multivitamin w/folic acid 1 mg      traMADol (ULTRAM) 50 mg tablet Take 100 mg by mouth three (3) times daily.  Takes in morning, evening, and at night      acetaminophen (MAPAP) 500 mg capsule Take 1,000 mg by mouth two (2) times a day.  insulin detemir U-100 (LEVEMIR U-100 INSULIN) 100 unit/mL injection 48 Units by SubCUTAneous route every evening.  Liraglutide (VICTOZA) 0.6 mg/0.1 mL (18 mg/3 mL) pnij by SubCUTAneous route daily. Dosing varies based on blood glucose level      cholecalciferol (VITAMIN D3) 1,000 unit cap Take 1,000 Units by mouth daily.  aspirin delayed-release 81 mg tablet Take 81 mg by mouth daily.  OMEGA-3 FATTY ACIDS/FISH OIL (OMEGA 3 FISH OIL PO) Take 2 Tabs by mouth daily.  cyclobenzaprine (FLEXERIL) 10 mg tablet Take 10 mg by mouth three (3) times daily as needed for Muscle Spasm(s).  VENTOLIN HFA 90 mcg/actuation inhaler Take 2 Puffs by inhalation every six (6) hours as needed for Wheezing. 1    allopurinol (ZYLOPRIM) 300 mg tablet Take 300 mg by mouth daily. 0    gabapentin (NEURONTIN) 400 mg capsule 400 mg two (2) times a day.  0    pravastatin (PRAVACHOL) 40 mg tablet Take 40 mg by mouth daily. 1       Past History     Past Medical History:  Past Medical History:   Diagnosis Date    Arthritis     Asthma     Cardiac murmur     Diabetes (HCC)     Fibroid, uterine     Hypertension     Neurological disorder     neuropathy    Other ill-defined conditions(259.46)     gout    Sleep disorder        Past Surgical History:  Past Surgical History:   Procedure Laterality Date    HX ORTHOPAEDIC      left shoulder - rotater cuff    HX ORTHOPAEDIC      HX TONSILLECTOMY      HX TUBAL LIGATION  1978    LAP,TUBAL CAUTERY         Family History:  Family History   Problem Relation Age of Onset    Cancer Maternal Grandfather     Alcohol abuse Mother     Asthma Maternal Grandmother     Diabetes Paternal Grandmother        Social History:  Social History     Tobacco Use    Smoking status: Never Smoker    Smokeless tobacco: Never Used   Substance Use Topics    Alcohol use:  No Comment: occassionaly    Drug use: No       Allergies:  No Known Allergies      Review of Systems   Review of Systems   Constitutional: Negative for chills and fever. Respiratory: Negative for cough and shortness of breath. Cardiovascular: Negative for chest pain. Gastrointestinal: Negative for abdominal pain, constipation, diarrhea, nausea and vomiting. Genitourinary: Negative for dysuria, frequency and hematuria. Musculoskeletal: Positive for arthralgias, joint swelling and myalgias. Neurological: Negative for weakness and numbness. All other systems reviewed and are negative. Physical Exam   Physical Exam  Constitutional:       General: She is not in acute distress. Appearance: She is well-developed. HENT:      Head: Normocephalic and atraumatic. Nose: Nose normal.      Mouth/Throat:      Mouth: Mucous membranes are moist.   Eyes:      Extraocular Movements: Extraocular movements intact. Conjunctiva/sclera: Conjunctivae normal.   Neck:      Musculoskeletal: Normal range of motion. Cardiovascular:      Comments: Well perfused  Pulmonary:      Effort: Pulmonary effort is normal. No respiratory distress. Musculoskeletal: Normal range of motion. Comments: Patient ambulatory from triage using a Rollator walker. On physical exam she is exquisitely tender over her bicep muscle as well as her left trapezius muscle. Pain elicited on ranging her shoulder with minimal range of motion secondary to pain. No signs of trauma or infection   Neurological:      General: No focal deficit present. Mental Status: She is alert and oriented to person, place, and time. Psychiatric:         Mood and Affect: Mood normal.          Diagnostic Study Results     Labs -   No results found for this or any previous visit (from the past 12 hour(s)).     Radiologic Studies -   No orders to display     CT Results  (Last 48 hours)    None        CXR Results  (Last 48 hours)    None Medical Decision Making   I am the first provider for this patient. I reviewed the vital signs, available nursing notes, past medical history, past surgical history, family history and social history. Vital Signs-Reviewed the patient's vital signs. Patient Vitals for the past 12 hrs:   Temp Pulse Resp BP SpO2   08/31/20 1429 99 °F (37.2 °C) (!) 109 18 (!) 151/98 97 %       Records Reviewed: Nursing Notes and Old Medical Records    Provider Notes (Medical Decision Making):   Patient presenting with left arm pain and most likely biceps and trapezius strain secondary to tenderness and pain elicited on movement. We will give her analgesics. ED Course:   Initial assessment performed. The patients presenting problems have been discussed, and they are in agreement with the care plan formulated and outlined with them. I have encouraged them to ask questions as they arise throughout their visit. Critical Care Time:   0    Disposition:  Discharge Note:  The patient has been re-evaluated and is ready for discharge. Reviewed available results with patient. Counseled patient on diagnosis and care plan. Patient has expressed understanding, and all questions have been answered. Patient agrees with plan and agrees to follow up as recommended, or to return to the ED if their symptoms worsen. Discharge instructions have been provided and explained to the patient, along with reasons to return to the ED. PLAN:  Current Discharge Medication List      START taking these medications    Details   naproxen (NAPROSYN) 500 mg tablet Take 1 Tab by mouth every twelve (12) hours as needed for Pain. Qty: 20 Tab, Refills: 0      methocarbamoL (Robaxin) 500 mg tablet Take 1 Tab by mouth four (4) times daily.   Qty: 20 Tab, Refills: 0         1.   2.   Follow-up Information     Follow up With Specialties Details Why Contact Info    Remedios Singh MD Internal Medicine  As needed [address] [place] [address] [address]af[address]  [phone]          3. Return to ED if worse     Diagnosis     Clinical Impression:   1. Strain of left biceps, initial encounter    2. Strain of left trapezius muscle, initial encounter        Attestations:    Abbey Palomares M.D. Please note that this dictation was completed with [employer], the [employer] voice recognition software. Quite often unanticipated grammatical, syntax, homophones, and other interpretive errors are inadvertently transcribed by the computer software. Please disregard these errors. Please excuse any errors that have escaped final proofreading. Thank you.

## 2020-08-31 NOTE — ED TRIAGE NOTES
Left shoulder pain radiating down to left elbow starting two days ago after hearing \"pop\" while showering.

## 2020-08-31 NOTE — ED NOTES
Patient presents to the ED with c/o left shoulder pain x2 days. Pt reports that the pain is worse at night and she heard a \"pop\" while showering. Pt reports some swelling. Pt reports using lidocaine cream and taking her regularly prescribed medications. Pt denies any injury or trauma. Pt is alert and oriented. Pt skin is warm and dry. Pt used a rollator to ambulate. No obvious deformity. Emergency Department Nursing Plan of Care       The Nursing Plan of Care is developed from the Nursing assessment and Emergency Department Attending provider initial evaluation. The plan of care may be reviewed in the ED Provider note.     The Plan of Care was developed with the following considerations:   Patient / Family readiness to learn indicated by:verbalized understanding  Persons(s) to be included in education: patient  Barriers to Learning/Limitations:No    Signed     Ayleen Abbott    8/31/2020   4:08 PM

## 2020-08-31 NOTE — ED NOTES
Patient  given copy of dc instructions and 0 paper script(s) and 2 electronic scripts. Patient  verbalized understanding of instructions and script (s). Patient given a current medication reconciliation form and verbalized understanding of their medications. Patient  verbalized understanding of the importance of discussing medications with  his or her physician or clinic they will be following up with. Patient alert and oriented and in no acute distress. Patient offered wheelchair from treatment area to hospital entrance, patient declined wheelchair. Pt used her rollator to exit the emergency department.

## 2021-05-17 RX ORDER — METFORMIN HYDROCHLORIDE 500 MG/1
500 TABLET, EXTENDED RELEASE ORAL
COMMUNITY

## 2021-05-17 RX ORDER — ACETAMINOPHEN 500 MG
500 TABLET ORAL 4 TIMES DAILY
COMMUNITY

## 2021-05-17 RX ORDER — POTASSIUM CHLORIDE 1500 MG/1
20 TABLET, FILM COATED, EXTENDED RELEASE ORAL 2 TIMES DAILY
COMMUNITY

## 2021-05-17 RX ORDER — AMLODIPINE BESYLATE 10 MG/1
10 TABLET ORAL DAILY
COMMUNITY

## 2021-05-17 RX ORDER — LANOLIN ALCOHOL/MO/W.PET/CERES
400 CREAM (GRAM) TOPICAL DAILY
COMMUNITY

## 2021-05-17 RX ORDER — ERGOCALCIFEROL 1.25 MG/1
50000 CAPSULE ORAL
COMMUNITY

## 2021-05-17 RX ORDER — METFORMIN HYDROCHLORIDE 500 MG/1
1000 TABLET, EXTENDED RELEASE ORAL
Status: ON HOLD | COMMUNITY
End: 2021-09-22

## 2021-05-17 RX ORDER — FUROSEMIDE 40 MG/1
40 TABLET ORAL 2 TIMES DAILY
COMMUNITY

## 2021-05-20 ENCOUNTER — TRANSCRIBE ORDER (OUTPATIENT)
Dept: REGISTRATION | Age: 66
End: 2021-05-20

## 2021-05-20 ENCOUNTER — HOSPITAL ENCOUNTER (OUTPATIENT)
Dept: PREADMISSION TESTING | Age: 66
Discharge: HOME | End: 2021-05-20
Payer: MEDICAID

## 2021-05-20 DIAGNOSIS — Z01.812 PRE-PROCEDURE LAB EXAM: ICD-10-CM

## 2021-05-20 DIAGNOSIS — Z01.812 PRE-PROCEDURE LAB EXAM: Primary | ICD-10-CM

## 2021-05-20 PROCEDURE — U0003 INFECTIOUS AGENT DETECTION BY NUCLEIC ACID (DNA OR RNA); SEVERE ACUTE RESPIRATORY SYNDROME CORONAVIRUS 2 (SARS-COV-2) (CORONAVIRUS DISEASE [COVID-19]), AMPLIFIED PROBE TECHNIQUE, MAKING USE OF HIGH THROUGHPUT TECHNOLOGIES AS DESCRIBED BY CMS-2020-01-R: HCPCS

## 2021-05-21 LAB — SARS-COV-2, COV2NT: NOT DETECTED

## 2021-05-23 RX ORDER — DEXTROMETHORPHAN/PSEUDOEPHED 2.5-7.5/.8
1.2 DROPS ORAL
Status: CANCELLED | OUTPATIENT
Start: 2021-05-23

## 2021-05-23 RX ORDER — SODIUM CHLORIDE 0.9 % (FLUSH) 0.9 %
5-40 SYRINGE (ML) INJECTION AS NEEDED
Status: CANCELLED | OUTPATIENT
Start: 2021-05-23

## 2021-05-23 RX ORDER — SODIUM CHLORIDE 9 MG/ML
50 INJECTION, SOLUTION INTRAVENOUS CONTINUOUS
Status: CANCELLED | OUTPATIENT
Start: 2021-05-23 | End: 2021-05-24

## 2021-05-23 RX ORDER — FENTANYL CITRATE 50 UG/ML
100 INJECTION, SOLUTION INTRAMUSCULAR; INTRAVENOUS
Status: CANCELLED | OUTPATIENT
Start: 2021-05-23

## 2021-05-23 RX ORDER — MIDAZOLAM HYDROCHLORIDE 1 MG/ML
.25-1 INJECTION, SOLUTION INTRAMUSCULAR; INTRAVENOUS
Status: CANCELLED | OUTPATIENT
Start: 2021-05-23 | End: 2021-05-24

## 2021-05-23 RX ORDER — FLUMAZENIL 0.1 MG/ML
0.2 INJECTION INTRAVENOUS
Status: CANCELLED | OUTPATIENT
Start: 2021-05-23 | End: 2021-05-24

## 2021-05-23 RX ORDER — NALOXONE HYDROCHLORIDE 0.4 MG/ML
0.4 INJECTION, SOLUTION INTRAMUSCULAR; INTRAVENOUS; SUBCUTANEOUS
Status: CANCELLED | OUTPATIENT
Start: 2021-05-23 | End: 2021-05-24

## 2021-05-23 RX ORDER — EPINEPHRINE 0.1 MG/ML
1 INJECTION INTRACARDIAC; INTRAVENOUS
Status: CANCELLED | OUTPATIENT
Start: 2021-05-23 | End: 2021-05-24

## 2021-05-23 RX ORDER — ATROPINE SULFATE 0.1 MG/ML
0.5 INJECTION INTRAVENOUS
Status: CANCELLED | OUTPATIENT
Start: 2021-05-23 | End: 2021-05-24

## 2021-05-23 RX ORDER — SODIUM CHLORIDE 0.9 % (FLUSH) 0.9 %
5-40 SYRINGE (ML) INJECTION EVERY 8 HOURS
Status: CANCELLED | OUTPATIENT
Start: 2021-05-23

## 2021-05-24 ENCOUNTER — HOSPITAL ENCOUNTER (OUTPATIENT)
Age: 66
Setting detail: OUTPATIENT SURGERY
Discharge: HOME | End: 2021-05-24
Attending: INTERNAL MEDICINE | Admitting: INTERNAL MEDICINE

## 2021-08-18 ENCOUNTER — APPOINTMENT (OUTPATIENT)
Dept: GENERAL RADIOLOGY | Age: 66
End: 2021-08-18
Attending: NURSE PRACTITIONER
Payer: MEDICAID

## 2021-08-18 ENCOUNTER — HOSPITAL ENCOUNTER (EMERGENCY)
Age: 66
Discharge: HOME | End: 2021-08-18
Attending: EMERGENCY MEDICINE
Payer: MEDICAID

## 2021-08-18 VITALS
OXYGEN SATURATION: 97 % | RESPIRATION RATE: 20 BRPM | TEMPERATURE: 97.4 F | BODY MASS INDEX: 43.65 KG/M2 | HEIGHT: 68 IN | WEIGHT: 288 LBS | HEART RATE: 94 BPM | SYSTOLIC BLOOD PRESSURE: 148 MMHG | DIASTOLIC BLOOD PRESSURE: 87 MMHG

## 2021-08-18 DIAGNOSIS — M19.031 OSTEOARTHRITIS OF RIGHT WRIST, UNSPECIFIED OSTEOARTHRITIS TYPE: Primary | ICD-10-CM

## 2021-08-18 PROCEDURE — 73110 X-RAY EXAM OF WRIST: CPT

## 2021-08-18 PROCEDURE — 99283 EMERGENCY DEPT VISIT LOW MDM: CPT

## 2021-08-18 PROCEDURE — 74011250637 HC RX REV CODE- 250/637: Performed by: NURSE PRACTITIONER

## 2021-08-18 RX ORDER — TRAMADOL HYDROCHLORIDE 50 MG/1
50 TABLET ORAL
Status: COMPLETED | OUTPATIENT
Start: 2021-08-18 | End: 2021-08-18

## 2021-08-18 RX ORDER — DICLOFENAC SODIUM 10 MG/G
GEL TOPICAL 4 TIMES DAILY
Qty: 2 G | Refills: 0 | Status: SHIPPED | OUTPATIENT
Start: 2021-08-18

## 2021-08-18 RX ORDER — METHOCARBAMOL 500 MG/1
500 TABLET, FILM COATED ORAL ONCE
Status: COMPLETED | OUTPATIENT
Start: 2021-08-18 | End: 2021-08-18

## 2021-08-18 RX ORDER — METHOCARBAMOL 500 MG/1
500 TABLET, FILM COATED ORAL 4 TIMES DAILY
Qty: 20 TABLET | Refills: 0 | Status: SHIPPED | OUTPATIENT
Start: 2021-08-18

## 2021-08-18 RX ORDER — TRAMADOL HYDROCHLORIDE 50 MG/1
50 TABLET ORAL
Qty: 6 TABLET | Refills: 0 | Status: SHIPPED | OUTPATIENT
Start: 2021-08-18 | End: 2021-08-23

## 2021-08-18 RX ADMIN — METHOCARBAMOL 500 MG: 500 TABLET ORAL at 10:51

## 2021-08-18 RX ADMIN — TRAMADOL HYDROCHLORIDE 50 MG: 50 TABLET, FILM COATED ORAL at 10:51

## 2021-08-18 NOTE — ED TRIAGE NOTES
Pt in with right hand pain x 2 days. Pt denies any trauma. Pt states taking tylenol with minimal relief.

## 2021-08-18 NOTE — ED NOTES

## 2021-08-18 NOTE — DISCHARGE INSTRUCTIONS
It was a pleasure taking care of you at [facility] Emergency Department today. We know that when you come to [facility], you are entrusting us with your health, comfort, and safety. Our physicians and nurses honor that trust, and we truly appreciate the opportunity to care for you and your loved ones. We also value our feedback. If you receive a survey about your Emergency Department experience today, please fill it out. We care about our patients' feedback, and we listen to what you have to say. Thank you!

## 2021-08-18 NOTE — ED PROVIDER NOTES
EMERGENCY DEPARTMENT HISTORY AND PHYSICAL EXAM    Date: 8/18/2021  Patient Name: Salvatore Le    History of Presenting Illness     Chief Complaint   Patient presents with    Hand Pain         History Provided By: Patient    HPI: Salvatore Le is a 61 y.o. female with a PMH of neuropathy, gout, arthritis, DM, HTN, Asthma  who presents with hand pain. Onset 2 days ago. Located to right hand. Mostly from the right wrist radiating to the hand. Associated symptoms include swelling, limited range of motion. Denies deformity, redness, warmth. Reports history of gout but mostly in her feet never in her wrist.  Denies injury to wrist or hand. Patient has limited medication to take due to drug interactions with her current meds. Denies fever, chills. PCP: Evelyn Pierre MD    Current Outpatient Medications   Medication Sig Dispense Refill    traMADoL (Ultram) 50 mg tablet Take 1 Tablet by mouth every six (6) hours as needed for Pain for up to 5 days. Max Daily Amount: 200 mg. 6 Tablet 0    methocarbamoL (Robaxin) 500 mg tablet Take 1 Tablet by mouth four (4) times daily. 20 Tablet 0    diclofenac (VOLTAREN) 1 % gel Apply  to affected area four (4) times daily. 2 g 0    metFORMIN ER (glucophage XR) 500 mg tablet Take 500 mg by mouth every morning.  metFORMIN ER (GLUCOPHAGE XR) 500 mg tablet Take 1,000 mg by mouth nightly.  amLODIPine (NORVASC) 10 mg tablet Take 10 mg by mouth daily.  furosemide (LASIX) 40 mg tablet Take 40 mg by mouth two (2) times a day.  ergocalciferol (Vitamin D2) 1,250 mcg (50,000 unit) capsule Take 50,000 Units by mouth every seven (7) days.  potassium chloride SR (K-TAB) 20 mEq tablet Take 20 mEq by mouth two (2) times a day.  magnesium oxide (MAG-OX) 400 mg tablet Take 400 mg by mouth daily.  acetaminophen (Tylenol Extra Strength) 500 mg tablet Take 500 mg by mouth four (4) times daily.  OTHER Victoza 1.8 mg subcutaneous every morning.  insulin detemir U-100 (LEVEMIR FLEXTOUCH) 100 unit/mL (3 mL) inpn 50 Units by SubCUTAneous route nightly.  albuterol (PROVENTIL HFA, VENTOLIN HFA, PROAIR HFA) 90 mcg/actuation inhaler Take 2 Puffs by inhalation four (4) times daily as needed for Wheezing.  atorvastatin (LIPITOR) 40 mg tablet Take 40 mg by mouth nightly.  colchicine 0.6 mg tablet Take 0.6 mg by mouth two (2) times a day.  prenatal no122/iron/folic acid (PRENATAL MULTI PO) Take 1 Cap by mouth daily. Prenatal, multivitamin w/folic acid 1 mg      aspirin delayed-release 81 mg tablet Take 81 mg by mouth daily.  allopurinol (ZYLOPRIM) 300 mg tablet Take 300 mg by mouth daily. 0    gabapentin (NEURONTIN) 400 mg capsule 400 mg two (2) times a day. 0       Past History     Past Medical History:  Past Medical History:   Diagnosis Date    Arthritis     Asthma     Cardiac murmur     Diabetes (HCC)     Fibroid, uterine     Hypertension     Neurological disorder     neuropathy    Other ill-defined conditions(732.04)     gout    Sleep disorder        Past Surgical History:  Past Surgical History:   Procedure Laterality Date    HX ORTHOPAEDIC      left shoulder - rotater cuff    HX ORTHOPAEDIC      HX TONSILLECTOMY      HX TUBAL LIGATION  1978    [place] LAP,TUBAL CAUTERY         Family History:  Family History   Problem Relation Age of Onset    Cancer Maternal Grandfather     Alcohol abuse Mother     Asthma Maternal Grandmother     Diabetes Paternal Grandmother        Social History:  Social History     Tobacco Use    Smoking status: Never Smoker    Smokeless tobacco: Never Used   Substance Use Topics    Alcohol use: No     Comment: occassionaly    Drug use: No       Allergies:  No Known Allergies      Review of Systems   Review of Systems   Constitutional: Negative for chills and fever. Musculoskeletal: Positive for arthralgias and joint swelling. Skin: Negative for wound.    Neurological: Negative for weakness and numbness. All other systems reviewed and are negative. Physical Exam     Vitals:    08/18/21 0914   BP: (!) 148/87   Pulse: 94   Resp: 20   Temp: 97.4 °F (36.3 °C)   SpO2: 97%   Weight: 130.6 kg (288 lb)   Height: 5' 8\" (1.727 m)     Physical Exam  Vitals and nursing note reviewed. Constitutional:       General: She is not in acute distress. Appearance: She is well-developed. She is not ill-appearing. HENT:      Head: Normocephalic and atraumatic. Cardiovascular:      Rate and Rhythm: Normal rate and regular rhythm. Pulses: Normal pulses. Heart sounds: Normal heart sounds. Pulmonary:      Effort: Pulmonary effort is normal.      Breath sounds: Normal breath sounds. Musculoskeletal:      Right wrist: Swelling and tenderness (Distal radius with + finklestein ) present. No deformity or crepitus. Decreased range of motion. Normal pulse. Comments: R wrist: no redness or warmth    Skin:     General: Skin is warm and dry. Neurological:      Mental Status: She is alert and oriented to person, place, and time. Diagnostic Study Results     Labs -   No results found for this or any previous visit (from the past 12 hour(s)). Radiologic Studies -   XR WRIST RT AP/LAT/OBL MIN 3V   Final Result   No acute abnormality. CT Results  (Last 48 hours)    None        CXR Results  (Last 48 hours)    None            Medical Decision Making   I am the first provider for this patient. I reviewed the vital signs, available nursing notes, past medical history, past surgical history, family history and social history. Vital Signs-Reviewed the patient's vital signs.     Records Reviewed: Nursing Notes, Old Medical Records and Previous Radiology Studies    Provider Notes (Medical Decision Making):   DDX: Gout, Arthritis, de quervans tendonitis, wrist sprain   72-year-old female presenting for right hand pain exhibiting tenderness to distal radius with visible swelling and limited range of motion with flexion and inversion noted. No redness, warmth, fever. Xray remarkable for mild DJD. Plan to immobilize with wrist immobilizer. Treat with diclofenac gel due to inability to take NSAIDs or steroids. Disposition:  Discharge     DISCHARGE NOTE:       Care plan outlined and precautions discussed. Patient has no new complaints, changes, or physical findings. All of pt's questions and concerns were addressed. Patient was instructed and agrees to follow up with PCP/ hand surgeon, as well as to return to the ED upon further deterioration. Patient is ready to go home. Follow-up Information     Follow up With Specialties Details Why Contact Info    Ilene Carrillo MD Hand Surgery, General Surgery Call  If symptoms worsen-- wrist arthritis [address] [facility]  [address]  [facility] [phone]            Discharge Medication List as of 8/18/2021 11:41 AM      START taking these medications    Details   traMADoL (Ultram) 50 mg tablet Take 1 Tablet by mouth every six (6) hours as needed for Pain for up to 5 days. Max Daily Amount: 200 mg., Normal, Disp-6 Tablet, R-0      methocarbamoL (Robaxin) 500 mg tablet Take 1 Tablet by mouth four (4) times daily. , Normal, Disp-20 Tablet, R-0      diclofenac (VOLTAREN) 1 % gel Apply  to affected area four (4) times daily. , Normal, Disp-2 g, R-0         CONTINUE these medications which have NOT CHANGED    Details   !! metFORMIN ER (glucophage XR) 500 mg tablet Take 500 mg by mouth every morning., Historical Med      !! metFORMIN ER (GLUCOPHAGE XR) 500 mg tablet Take 1,000 mg by mouth nightly., Historical Med      amLODIPine (NORVASC) 10 mg tablet Take 10 mg by mouth daily. , Historical Med      furosemide (LASIX) 40 mg tablet Take 40 mg by mouth two (2) times a day., Historical Med      ergocalciferol (Vitamin D2) 1,250 mcg (50,000 unit) capsule Take 50,000 Units by mouth every seven (7) days. , Historical Med      potassium chloride SR (K-TAB) 20 mEq tablet Take 20 mEq by mouth two (2) times a day., Historical Med      magnesium oxide (MAG-OX) 400 mg tablet Take 400 mg by mouth daily. , Historical Med      acetaminophen (Tylenol Extra Strength) 500 mg tablet Take 500 mg by mouth four (4) times daily. , Historical Med      OTHER Victoza 1.8 mg subcutaneous every morning., Historical Med      insulin detemir U-100 (LEVEMIR FLEXTOUCH) 100 unit/mL (3 mL) inpn 50 Units by SubCUTAneous route nightly., Historical Med      albuterol (PROVENTIL HFA, VENTOLIN HFA, PROAIR HFA) 90 mcg/actuation inhaler Take 2 Puffs by inhalation four (4) times daily as needed for Wheezing., Historical Med      atorvastatin (LIPITOR) 40 mg tablet Take 40 mg by mouth nightly., Historical Med      colchicine 0.6 mg tablet Take 0.6 mg by mouth two (2) times a day., Historical Med      prenatal no122/iron/folic acid (PRENATAL MULTI PO) Take 1 Cap by mouth daily. Prenatal, multivitamin w/folic acid 1 mg, Historical Med      aspirin delayed-release 81 mg tablet Take 81 mg by mouth daily. , Historical Med      allopurinol (ZYLOPRIM) 300 mg tablet Take 300 mg by mouth daily. , Historical Med, R-0      gabapentin (NEURONTIN) 400 mg capsule 400 mg two (2) times a day., Historical Med, R-0       !! - Potential duplicate medications found. Please discuss with provider. STOP taking these medications       cyclobenzaprine (FLEXERIL) 10 mg tablet Comments:   Reason for Stopping:               Procedures:  Procedures    Please note that this dictation was completed with Dragon, computer voice recognition software. Quite often unanticipated grammatical, syntax, homophones, and other interpretive errors are inadvertently transcribed by the computer software. Please disregard these errors. Additionally, please excuse any errors that have escaped final proofreading. Diagnosis     Clinical Impression:   1.  Osteoarthritis of right wrist, unspecified osteoarthritis type

## 2021-09-20 ENCOUNTER — TRANSCRIBE ORDER (OUTPATIENT)
Dept: REGISTRATION | Age: 66
End: 2021-09-20

## 2021-09-20 ENCOUNTER — HOSPITAL ENCOUNTER (OUTPATIENT)
Dept: PREADMISSION TESTING | Age: 66
Discharge: HOME | End: 2021-09-20
Payer: MEDICAID

## 2021-09-20 DIAGNOSIS — Z01.812 PRE-PROCEDURE LAB EXAM: Primary | ICD-10-CM

## 2021-09-20 DIAGNOSIS — Z01.812 PRE-PROCEDURE LAB EXAM: ICD-10-CM

## 2021-09-20 PROCEDURE — U0005 INFEC AGEN DETEC AMPLI PROBE: HCPCS

## 2021-09-22 ENCOUNTER — HOSPITAL ENCOUNTER (OUTPATIENT)
Age: 66
Setting detail: OUTPATIENT SURGERY
Discharge: HOME | End: 2021-09-22
Attending: INTERNAL MEDICINE | Admitting: INTERNAL MEDICINE

## 2021-09-22 ENCOUNTER — ANESTHESIA EVENT (OUTPATIENT)
Dept: ENDOSCOPY | Age: 66
End: 2021-09-22

## 2021-09-22 ENCOUNTER — ANESTHESIA (OUTPATIENT)
Dept: ENDOSCOPY | Age: 66
End: 2021-09-22

## 2021-09-22 VITALS
RESPIRATION RATE: 20 BRPM | WEIGHT: 293 LBS | SYSTOLIC BLOOD PRESSURE: 145 MMHG | OXYGEN SATURATION: 98 % | TEMPERATURE: 98.5 F | DIASTOLIC BLOOD PRESSURE: 82 MMHG | HEIGHT: 65 IN | HEART RATE: 91 BPM | BODY MASS INDEX: 48.82 KG/M2

## 2021-09-22 LAB
SARS-COV-2, XPLCVT: NOT DETECTED
SOURCE, COVRS: NORMAL

## 2021-09-22 RX ORDER — CYCLOBENZAPRINE HCL 10 MG
TABLET ORAL
COMMUNITY

## 2021-11-04 ENCOUNTER — TRANSCRIBE ORDER (OUTPATIENT)
Dept: REGISTRATION | Age: 66
End: 2021-11-04

## 2021-11-04 ENCOUNTER — HOSPITAL ENCOUNTER (OUTPATIENT)
Dept: PREADMISSION TESTING | Age: 66
Discharge: HOME | End: 2021-11-04
Payer: MEDICAID

## 2021-11-04 DIAGNOSIS — Z01.812 PRE-PROCEDURE LAB EXAM: ICD-10-CM

## 2021-11-04 DIAGNOSIS — Z01.812 PRE-PROCEDURE LAB EXAM: Primary | ICD-10-CM

## 2021-11-04 PROCEDURE — U0005 INFEC AGEN DETEC AMPLI PROBE: HCPCS

## 2021-11-05 LAB
SARS-COV-2, XPLCVT: NOT DETECTED
SOURCE, COVRS: NORMAL

## 2022-03-19 PROBLEM — N17.9 AKI (ACUTE KIDNEY INJURY) (HCC): Status: ACTIVE | Noted: 2018-11-05

## 2023-08-25 ENCOUNTER — HOSPITAL ENCOUNTER (OUTPATIENT)
Facility: HOSPITAL | Age: 68
End: 2023-08-25
Payer: MEDICARE

## 2023-08-25 DIAGNOSIS — H54.40 BLINDNESS OF LEFT EYE, UNSPECIFIED RIGHT EYE VISUAL IMPAIRMENT CATEGORY: ICD-10-CM

## 2023-08-25 PROCEDURE — 70200 X-RAY EXAM OF EYE SOCKETS: CPT

## 2025-03-01 ENCOUNTER — HOSPITAL ENCOUNTER (EMERGENCY)
Facility: HOSPITAL | Age: 70
Discharge: HOME | End: 2025-03-01
Payer: MEDICARE

## 2025-03-01 VITALS
TEMPERATURE: 98.3 F | HEART RATE: 82 BPM | HEIGHT: 65 IN | OXYGEN SATURATION: 100 % | SYSTOLIC BLOOD PRESSURE: 129 MMHG | WEIGHT: 215.39 LBS | DIASTOLIC BLOOD PRESSURE: 61 MMHG | RESPIRATION RATE: 18 BRPM | BODY MASS INDEX: 35.89 KG/M2

## 2025-03-01 DIAGNOSIS — T46.4X5A ACE INHIBITOR-AGGRAVATED ANGIOEDEMA, INITIAL ENCOUNTER: Primary | ICD-10-CM

## 2025-03-01 DIAGNOSIS — T78.3XXA ACE INHIBITOR-AGGRAVATED ANGIOEDEMA, INITIAL ENCOUNTER: Primary | ICD-10-CM

## 2025-03-01 LAB
COMMENT:: NORMAL
SPECIMEN HOLD: NORMAL

## 2025-03-01 PROCEDURE — 86900 BLOOD TYPING SEROLOGIC ABO: CPT

## 2025-03-01 PROCEDURE — 86901 BLOOD TYPING SEROLOGIC RH(D): CPT

## 2025-03-01 PROCEDURE — 86850 RBC ANTIBODY SCREEN: CPT

## 2025-03-01 PROCEDURE — 2500000003 HC RX 250 WO HCPCS

## 2025-03-01 PROCEDURE — 2580000003 HC RX 258

## 2025-03-01 PROCEDURE — 96374 THER/PROPH/DIAG INJ IV PUSH: CPT

## 2025-03-01 PROCEDURE — 6370000000 HC RX 637 (ALT 250 FOR IP)

## 2025-03-01 PROCEDURE — 96372 THER/PROPH/DIAG INJ SC/IM: CPT

## 2025-03-01 PROCEDURE — 96375 TX/PRO/DX INJ NEW DRUG ADDON: CPT

## 2025-03-01 PROCEDURE — 6360000002 HC RX W HCPCS

## 2025-03-01 PROCEDURE — 99284 EMERGENCY DEPT VISIT MOD MDM: CPT

## 2025-03-01 PROCEDURE — 36415 COLL VENOUS BLD VENIPUNCTURE: CPT

## 2025-03-01 RX ORDER — EPINEPHRINE 1 MG/ML
0.5 INJECTION, SOLUTION INTRAMUSCULAR; SUBCUTANEOUS
Status: COMPLETED | OUTPATIENT
Start: 2025-03-01 | End: 2025-03-01

## 2025-03-01 RX ORDER — SODIUM CHLORIDE 9 MG/ML
INJECTION, SOLUTION INTRAVENOUS PRN
Status: DISCONTINUED | OUTPATIENT
Start: 2025-03-01 | End: 2025-03-01

## 2025-03-01 RX ORDER — DIPHENHYDRAMINE HCL 25 MG
25 CAPSULE ORAL
Status: COMPLETED | OUTPATIENT
Start: 2025-03-01 | End: 2025-03-01

## 2025-03-01 RX ADMIN — TRANEXAMIC ACID 1000 MG: 100 INJECTION, SOLUTION INTRAVENOUS at 06:27

## 2025-03-01 RX ADMIN — EPINEPHRINE 0.5 MG: 1 INJECTION INTRAMUSCULAR; INTRAVENOUS; SUBCUTANEOUS at 06:24

## 2025-03-01 RX ADMIN — WATER 125 MG: 1 INJECTION INTRAMUSCULAR; INTRAVENOUS; SUBCUTANEOUS at 06:11

## 2025-03-01 RX ADMIN — DIPHENHYDRAMINE HYDROCHLORIDE 25 MG: 25 CAPSULE ORAL at 06:13

## 2025-03-01 ASSESSMENT — PAIN - FUNCTIONAL ASSESSMENT
PAIN_FUNCTIONAL_ASSESSMENT: NONE - DENIES PAIN
PAIN_FUNCTIONAL_ASSESSMENT: NONE - DENIES PAIN

## 2025-03-01 NOTE — ED PROVIDER NOTES
[facility] EMERGENCY DEPARTMENT  EMERGENCY DEPARTMENT ENCOUNTER    Patient Name: Yumi Rod  MRN: [id number]  YOB: 1955  Provider: Silverio Iqbal MD  PCP: Kameron Negron MD  Time/Date of evaluation:  3/1/25    History of Presenting Illness     Chief Complaint   Patient presents with    Oral Swelling       HISTORY (Narrative):   Yumi Rod is a 69 y.o. female presents with lip swelling that started yesterday evening around 4 PM but woke up around 430 this morning and noted that her lips were even more swollen including the upper and lower lip. The patient reports taking lisinopril 2 to 3 weeks prior.  Patient denies any food allergies, new soaps or lotions.  Patient denies rash, itchiness, nausea, vomiting.  The swelling has primarily affected the lips without involving the inside of the mouth or tongue.  Patient notes she is able to swallow and talk without difficulties.  Of note patient was diagnosed with uterine cancer recently and had her last chemotherapy treatment 2 weeks prior.    Medical History  - HLD  - CAD  - Gout  - COPD  - SHAZIA  - T2D  - HTN  - CKD3  - Severe MR/TR who underwent MAZE  - CHAD ligation  - Bioprosthetic MVR and TV repair with IABP placement on 06/03 by Dr. Ley  - Atrial fibrillation  - Uterine Carcinosarcoma  - CHFpEF  - Pulmonary HTN    Current and Past Medications and Supplements  - Lisinopril  - Amiodarone (filled approximately two weeks ago)  - Atorvastatin    Review of Systems  - Respiratory: Lungs clear on auscultation  - Integumentary: Swelling of lips      Nursing Notes were all reviewed and agreed with or any disagreements were addressed in the HPI.    Past History     PAST MEDICAL HISTORY:  Past Medical History:   Diagnosis Date    Arthritis     Asthma     Cardiac murmur     Diabetes (HCC)     Fibroid, uterine     Hypertension     Neurological disorder     neuropathy    Other ill-defined conditions(799.89)     gout    Sleep apnea     with CPAP   Conjunctiva/sclera: Conjunctivae normal.      Pupils: Pupils are equal, round, and reactive to light.   Cardiovascular:      Rate and Rhythm: Normal rate and regular rhythm.   Pulmonary:      Effort: Pulmonary effort is normal. No respiratory distress.      Breath sounds: Normal breath sounds. No wheezing.   Abdominal:      General: Abdomen is flat. There is no distension.      Palpations: Abdomen is soft.      Tenderness: There is no abdominal tenderness. There is no guarding.   Musculoskeletal:         General: No swelling.      Cervical back: Normal range of motion and neck supple.   Skin:     General: Skin is warm.      Capillary Refill: Capillary refill takes less than 2 seconds.   Neurological:      General: No focal deficit present.      Mental Status: She is alert and oriented to person, place, and time. Mental status is at baseline.       Diagnostic Study Results     LABS:  Results for orders placed or performed during the hospital encounter of 03/01/25   Extra Tubes Hold   Result Value Ref Range    Specimen HOld 1LAV,1PST,1RED,1PINK,1BLUE     Comment:        Add-on orders for these samples will be processed based on acceptable specimen integrity and analyte stability, which may vary by analyte.   PREPARE PLASMA, 2 Units   Result Value Ref Range    Unit Number [id number]     Product Code Blood Bank FP 24h,Thaw2     Unit Divison 00     Dispense Status Blood Bank IN-TRANSIT     Unit Number [id number]     Product Code Blood Bank FP 24h, Thaw     Unit Divison 00     Dispense Status Blood Bank IN-TRANSIT    TYPE AND SCREEN   Result Value Ref Range    Crossmatch expiration date 03/04/2025,2359     ABO/Rh B POSITIVE     Antibody Screen NEG        RADIOLOGIC STUDIES:   Non x-ray images such as CT, Ultrasound and MRI are read by the radiologist. X-ray images are visualized and preliminarily interpreted by the ED Provider with the findings as listed in the ED Course section below.     Interpretation per the

## 2025-03-01 NOTE — ED TRIAGE NOTES
Pt presents to ED via [facility] for upper lip swelling beginning 4pm yesterday  Pt reports waking this morning at 430am with significant upper and lower swelling   Pt reports beginnign lisinopril approx one month  Pt reports uterine cancer, received first chemo treatment approx 2 weeks ago  Airway patent, NAD.  Pt denies chest pain, denies SOB  Pt speaking in complete sentences.   Pt denies pain at this time.

## 2025-03-01 NOTE — DISCHARGE INSTRUCTIONS
Thank You!    It was a pleasure taking care of you in our Emergency Department today. We know that when you come to our Emergency Department, you are entrusting us with your health, comfort, and safety. Our physicians and nurses honor that trust, and truly appreciate the opportunity to care for you and your loved ones.      We also value your feedback. If you receive a survey about your Emergency Department experience today, please fill it out.  We care about our patients' feedback, and we listen to what you have to say.  Thank you.    Silverio Iqbal MD  ________________________________________________________________________  I have included a copy of your lab results and/or radiologic studies from today's visit so you can have them easily available at your follow-up visit. We hope you feel better and please do not hesitate to contact the ED if you have any questions at all!    Recent Results (from the past 12 hour(s))   Extra Tubes Hold    Collection Time: 03/01/25  6:14 AM   Result Value Ref Range    Specimen HOld 1LAV,1PST,1RED,1PINK,1BLUE     Comment:        Add-on orders for these samples will be processed based on acceptable specimen integrity and analyte stability, which may vary by analyte.     No orders to display       The exam and treatment you received in the Emergency Department were for an urgent problem and are not intended as complete care. It is important that you follow up with a doctor, nurse practitioner, or physician assistant for ongoing care. If your symptoms become worse or you do not improve as expected and you are unable to reach your usual health care provider, you should return to the Emergency Department. We are available 24 hours a day.    Please take your discharge instructions with you when you go to your follow-up appointment.     If a prescription has been provided, please have it filled as soon as possible to prevent a delay in treatment. Read the entire medication instruction  sheet provided to you by the pharmacy. If you have any questions or reservations about taking the medication due to side effects or interactions with other medications, please call your primary care physician or contact the ER to speak with the charge nurse.     Please make an appointment with your family doctor or the physician you were referred to for follow-up of this visit as instructed on your discharge paperwork. Return to the ER if you are unable to be seen or if you are unable to be seen in a timely manner.    Should you experience abdominal pain lasting greater than 6 hours, chest pain, difficulty breathing, fever/chills, numbness/tingling, skin changes or other symptoms that concern you, return to the ED sooner. If you feel worse over the next 24 hours, please return to the ED. We are available 24 hours a day. Thank you for trusting us with your care!

## 2025-03-01 NOTE — ED NOTES
RN to bedside. Pt requesting something to drink and eat. Pt advised that she can't eat or drink until some of the swelling of her lips decreases. Pt provided with pillow for legs.  Pt has no other need at this time.  Call bell within reach

## 2025-03-01 NOTE — ED NOTES
Patient out of bed to bathroom with slightly unsteady gait.  Patient's breathing remains even and unlabored.

## 2025-03-02 LAB
ABO + RH BLD: NORMAL
BLD PROD TYP BPU: NORMAL
BLD PROD TYP BPU: NORMAL
BLOOD BANK DISPENSE STATUS: NORMAL
BLOOD BANK DISPENSE STATUS: NORMAL
BLOOD GROUP ANTIBODIES SERPL: NORMAL
BPU ID: NORMAL
BPU ID: NORMAL
SPECIMEN EXP DATE BLD: NORMAL
UNIT DIVISION: 0
UNIT DIVISION: 0

## 2025-03-02 NOTE — ED NOTES
Discharge instructions were given to the patient by WAQAS HINKLE RN.     The patient left the Emergency Department alert and oriented and in no acute distress with 0 prescription. The patient was encouraged to call or return to the ED for worsening issues or problems and was encouraged to schedule a follow up appointment for continuing care.     Ambulation assessment completed before discharge.  Pt left Emergency Department at expected ambulatory status with no ortho devices  Ortho device education: none    The patient verbalized understanding of discharge instructions and prescriptions, all questions were answered. The patient has no further concerns at this time.

## 2025-03-04 ENCOUNTER — HOSPITAL ENCOUNTER (EMERGENCY)
Facility: HOSPITAL | Age: 70
Discharge: HOME | End: 2025-03-04
Payer: MEDICARE

## 2025-03-04 VITALS
SYSTOLIC BLOOD PRESSURE: 147 MMHG | HEIGHT: 65 IN | WEIGHT: 212 LBS | DIASTOLIC BLOOD PRESSURE: 83 MMHG | OXYGEN SATURATION: 100 % | BODY MASS INDEX: 35.32 KG/M2 | TEMPERATURE: 97 F | RESPIRATION RATE: 24 BRPM

## 2025-03-04 DIAGNOSIS — T46.4X5D ACE INHIBITOR-AGGRAVATED ANGIOEDEMA, SUBSEQUENT ENCOUNTER: Primary | ICD-10-CM

## 2025-03-04 DIAGNOSIS — T78.3XXD ACE INHIBITOR-AGGRAVATED ANGIOEDEMA, SUBSEQUENT ENCOUNTER: Primary | ICD-10-CM

## 2025-03-04 PROCEDURE — 2500000003 HC RX 250 WO HCPCS

## 2025-03-04 PROCEDURE — 6360000002 HC RX W HCPCS

## 2025-03-04 PROCEDURE — 99284 EMERGENCY DEPT VISIT MOD MDM: CPT

## 2025-03-04 PROCEDURE — 96361 HYDRATE IV INFUSION ADD-ON: CPT

## 2025-03-04 PROCEDURE — 96375 TX/PRO/DX INJ NEW DRUG ADDON: CPT

## 2025-03-04 PROCEDURE — 96374 THER/PROPH/DIAG INJ IV PUSH: CPT

## 2025-03-04 PROCEDURE — 2580000003 HC RX 258

## 2025-03-04 RX ORDER — DIPHENHYDRAMINE HCL 25 MG
25 CAPSULE ORAL EVERY 6 HOURS PRN
Qty: 20 CAPSULE | Refills: 0 | Status: SHIPPED | OUTPATIENT
Start: 2025-03-04 | End: 2025-03-14

## 2025-03-04 RX ORDER — DIPHENHYDRAMINE HYDROCHLORIDE 50 MG/ML
25 INJECTION INTRAMUSCULAR; INTRAVENOUS
Status: COMPLETED | OUTPATIENT
Start: 2025-03-04 | End: 2025-03-04

## 2025-03-04 RX ORDER — EPINEPHRINE 0.3 MG/.3ML
0.3 INJECTION SUBCUTANEOUS ONCE
Qty: 1 EACH | Refills: 0 | Status: SHIPPED | OUTPATIENT
Start: 2025-03-04 | End: 2025-03-04

## 2025-03-04 RX ORDER — 0.9 % SODIUM CHLORIDE 0.9 %
1000 INTRAVENOUS SOLUTION INTRAVENOUS ONCE
Status: COMPLETED | OUTPATIENT
Start: 2025-03-04 | End: 2025-03-04

## 2025-03-04 RX ADMIN — WATER 125 MG: 1 INJECTION INTRAMUSCULAR; INTRAVENOUS; SUBCUTANEOUS at 14:56

## 2025-03-04 RX ADMIN — DIPHENHYDRAMINE HYDROCHLORIDE 25 MG: 50 INJECTION INTRAMUSCULAR; INTRAVENOUS at 14:57

## 2025-03-04 RX ADMIN — SODIUM CHLORIDE 1000 ML: 0.9 INJECTION, SOLUTION INTRAVENOUS at 14:55

## 2025-03-04 ASSESSMENT — ENCOUNTER SYMPTOMS
CHEST TIGHTNESS: 0
CHOKING: 0
SHORTNESS OF BREATH: 0
TROUBLE SWALLOWING: 0
WHEEZING: 0

## 2025-03-04 ASSESSMENT — PAIN - FUNCTIONAL ASSESSMENT: PAIN_FUNCTIONAL_ASSESSMENT: NONE - DENIES PAIN

## 2025-03-04 NOTE — DISCHARGE INSTRUCTIONS
Please throw out your lisinopril.  Never take it again.  If you begin to feel short of breath, have difficulty breathing or notice your tongue and lip swelling, please take the EpiPen as prescribed.  Please return to the emergency room after using EpiPen.

## 2025-03-04 NOTE — ED NOTES
Pt presents to ED complaining of accidentally taking her lisinopril after jut finding out she was allergic to it. Pt denies taking medications for relief. Pt airway patent. Pt is alert and oriented x 4, RR even and unlabored, skin is warm and dry. Pt appears in NAD at this time. Assessment completed and pt updated on plan of care.  Call bell in reach.     The Nursing Plan of Care is developed from the Nursing assessment and Emergency Department Attending provider initial evaluation.  The plan of care may be reviewed in the “ED Provider note”.    The Plan of Care was developed with the following considerations:  Patient / Family readiness to learn indicated by:verbalized understanding  Persons(s) to be included in education: patient  Barriers to Learning/Limitations:None    Signed    YANET ARREOLA RN    3/4/2025   3:04 PM

## 2025-03-04 NOTE — ED PROVIDER NOTES
Clinician of Record.   Goldie Garcia PA-C (electronically signed)    (Please note that parts of this dictation were completed with voice recognition software. Quite often unanticipated grammatical, syntax, homophones, and other interpretive errors are inadvertently transcribed by the computer software. Please disregards these errors. Please excuse any errors that have escaped final proofreading.)     oGldie Garcia PA-C  03/04/25 0943

## 2025-03-04 NOTE — ED TRIAGE NOTES
Patient states she was here not long ago and diagnosed with an allergy to Lisinopril and was told not to take it anymore and this morning she accidentally took it.  She states she does not currently have any symptoms other that she feels like her face is swelling.

## 2025-03-04 NOTE — ED NOTES
Discharge instructions were given to the patient by YANET ARREOLA RN.     The patient left the Emergency Department alert and oriented and in no acute distress with 2 prescriptions. The patient was encouraged to call or return to the ED for worsening issues or problems and was encouraged to schedule a follow up appointment for continuing care.     Ambulation assessment completed before discharge.  Pt left Emergency Department at expected ambulatory status with wheelchair  Ortho device education: none    The patient verbalized understanding of discharge instructions and prescriptions, all questions were answered. The patient has no further concerns at this time.